# Patient Record
Sex: FEMALE | Race: WHITE | HISPANIC OR LATINO | Employment: UNEMPLOYED | URBAN - METROPOLITAN AREA
[De-identification: names, ages, dates, MRNs, and addresses within clinical notes are randomized per-mention and may not be internally consistent; named-entity substitution may affect disease eponyms.]

---

## 2017-01-06 ENCOUNTER — NON-PROVIDER VISIT (OUTPATIENT)
Dept: OBGYN | Facility: CLINIC | Age: 22
End: 2017-01-06
Payer: MEDICAID

## 2017-01-06 DIAGNOSIS — Z32.00 ENCOUNTER FOR PREGNANCY TEST, RESULT UNKNOWN: ICD-10-CM

## 2017-01-06 LAB
INT CON NEG: NEGATIVE
INT CON POS: POSITIVE
POC URINE PREGNANCY TEST: POSITIVE

## 2017-01-06 PROCEDURE — 81025 URINE PREGNANCY TEST: CPT | Performed by: PHYSICIAN ASSISTANT

## 2017-02-03 ENCOUNTER — INITIAL PRENATAL (OUTPATIENT)
Dept: OBGYN | Facility: CLINIC | Age: 22
End: 2017-02-03
Payer: MEDICAID

## 2017-02-03 VITALS
HEIGHT: 63 IN | WEIGHT: 157 LBS | SYSTOLIC BLOOD PRESSURE: 124 MMHG | BODY MASS INDEX: 27.82 KG/M2 | DIASTOLIC BLOOD PRESSURE: 78 MMHG

## 2017-02-03 DIAGNOSIS — O28.0 ABNORMAL QUAD SCREEN: ICD-10-CM

## 2017-02-03 DIAGNOSIS — Z34.82 PRENATAL CARE, SUBSEQUENT PREGNANCY, SECOND TRIMESTER: ICD-10-CM

## 2017-02-03 LAB
APPEARANCE UR: NORMAL
BILIRUB UR STRIP-MCNC: NORMAL MG/DL
COLOR UR AUTO: NORMAL
GLUCOSE UR STRIP.AUTO-MCNC: NORMAL MG/DL
KETONES UR STRIP.AUTO-MCNC: NORMAL MG/DL
LEUKOCYTE ESTERASE UR QL STRIP.AUTO: NORMAL
NITRITE UR QL STRIP.AUTO: NORMAL
PH UR STRIP.AUTO: 6 [PH] (ref 5–8)
PROT UR QL STRIP: NORMAL MG/DL
RBC UR QL AUTO: NORMAL
SP GR UR STRIP.AUTO: 1.01
UROBILINOGEN UR STRIP-MCNC: NORMAL MG/DL

## 2017-02-03 PROCEDURE — 59401 PR NEW OB VISIT: CPT | Performed by: NURSE PRACTITIONER

## 2017-02-03 PROCEDURE — 81002 URINALYSIS NONAUTO W/O SCOPE: CPT | Performed by: NURSE PRACTITIONER

## 2017-02-03 NOTE — PROGRESS NOTES
Pt. Here for NOB visit today.  #[phone]  First prenatal care  Pt. States no complaints.  Pharmacy verified.

## 2017-02-03 NOTE — PROGRESS NOTES
"S:  Araceli Vale is a 22 y.o.  who presents for her new OB exam.  She is 19w0d with and MILLIE of Estimated Date of Delivery: 17 by LMP. She has no complaints except for nausea for which she does not wish for intervention.  She is currently not working outside the home. No  heavy lifting or chemical exposure. No ER visits or previous care in this pregnancy.     desires AFP.  declines CF.  Denies VB, LOF, or cramping.  Denies dysuria, vaginal DC. Reports possible fetal movement.     Pt is  and lives with FOB.  Pregnancy is desired.      No past medical history on file.  No family history on file.  Social History     Social History   • Marital Status: Single     Spouse Name: N/A   • Number of Children: N/A   • Years of Education: N/A     Occupational History   • Not on file.     Social History Main Topics   • Smoking status: Never Smoker    • Smokeless tobacco: Never Used   • Alcohol Use: No   • Drug Use: No   • Sexual Activity:     Partners: Male     Birth Control/ Protection: Condom      Comment: planned pregnancy, same fob is involved     Other Topics Concern   • Not on file     Social History Narrative     OB History    Para Term  AB SAB TAB Ectopic Multiple Living   3 1 1  1 1    1      # Outcome Date GA Lbr Geovanny/2nd Weight Sex Delivery Anes PTL Lv   3 Current            2 Term 12/05/15 40w0d  2.3 kg (5 lb 1.1 oz) F Vag-Spont None N Y      Comments: Denies any complications   1 SAB 2014 8w0d             Comments: passed on its own          History of HSV I or II in self or partner: no  History of Thyroid problems: no    O:    Filed Vitals:    17 0831   BP: 124/78   Height: 1.6 m (5' 3\")   Weight: 71.215 kg (157 lb)      See Prenatal Physical.    Wet mount: none      A:   1.  IUP @ 19w0d per LMP        2.  S=D        3.  See problem list below               Patient Active Problem List    Diagnosis Date Noted   • Encounter for supervision of other normal pregnancy, first " trimester 08/11/2016   • Short interval between pregnancies  - 7 months 07/08/2016         P:  1.  GC/CT & pap done        2.  Prenatal labs ordered - lab slip given including AFP        3.  Discussed PNV, diet, avoidances and adequate water intake        4.  NOB packet given        5.  Return to office in 4 wks        6.  Complete OB US in first available.            No orders of the defined types were placed in this encounter.

## 2017-02-03 NOTE — MR AVS SNAPSHOT
"        Araceli Vale   2/3/2017 8:30 AM   Initial Prenatal   MRN: [id number]    Department[facility]   Dept Phone:  [phone]    Description:  Female : 1995   Provider:  Meghana Arredondo D.N.P.; [facility] INTAKE           Allergies as of 2/3/2017     No Known Allergies      You were diagnosed with     Prenatal care, subsequent pregnancy, second trimester   [430950]         Vital Signs     Blood Pressure Height Weight Body Mass Index Last Menstrual Period Breastfeeding?    124/78 mmHg 1.6 m (5' 3\") 71.215 kg (157 lb) 27.82 kg/m2 2016 (Exact Date) Unknown    Smoking Status                   Never Smoker            Basic Information     Date Of Birth Sex Race Ethnicity Preferred Language    1995 Female White  Origin (Upper sorbian,Ethiopian,Pakistani,Sai, etc) English      Problem List              ICD-10-CM Priority Class Noted - Resolved    Short interval between pregnancies  - 7 months O09.891   2016 - Present    Encounter for supervision of other normal pregnancy, first trimester Z34.81   2016 - Present      Health Maintenance        Date Due Completion Dates    IMM HEP B VACCINE (1 of 3 - Primary Series) 1995 ---    IMM HEP A VACCINE (1 of 2 - Standard Series) 1996 ---    IMM HPV VACCINE (1 of 3 - Female 3 Dose Series) 2006 ---    IMM VARICELLA (CHICKENPOX) VACCINE (1 of 2 - 2 Dose Adolescent Series) 2008 ---    IMM INFLUENZA (1) 2016 ---    PAP SMEAR 2018    IMM DTaP/Tdap/Td Vaccine (2 - Td) 9/15/2025 9/15/2015            Current Immunizations     Tdap Vaccine 9/15/2015      Below and/or attached are the medications your provider expects you to take. Review all of your home medications and newly ordered medications with your provider and/or pharmacist. Follow medication instructions as directed by your provider and/or pharmacist. Please keep your medication list with you and share with your provider. Update the information when medications are " discontinued, doses are changed, or new medications (including over-the-counter products) are added; and carry medication information at all times in the event of emergency situations     Allergies:  No Known Allergies          Medications  Valid as of: February 03, 2017 -  9:07 AM    Generic Name Brand Name Tablet Size Instructions for use    Hydrocodone-Acetaminophen (Tab) NORCO 5-325 MG Take 1-2 Tabs by mouth every four hours as needed.        Ondansetron (TABLET DISPERSIBLE) ZOFRAN ODT 4 MG Take 1 Tab by mouth every 8 hours as needed for Nausea/Vomiting.        Prenatal Multivit-Min-Fe-FA   Take  by mouth.        .                 Medicines prescribed today were sent to:     [facility] [address] [place] [address] (S), [place] - [address]    [address] [facility] [place] ([place]) [place] [address]    Phone: [phone] Fax: [phone]    Open 24 Hours?: No      Medication refill instructions:       If your prescription bottle indicates you have medication refills left, it is not necessary to call your provider’s office. Please contact your pharmacy and they will refill your medication.    If your prescription bottle indicates you do not have any refills left, you may request refills at any time through one of the following ways: The online [employer] system (except Urgent Care), by calling your provider’s office, or by asking your pharmacy to contact your provider’s office with a refill request. Medication refills are processed only during regular business hours and may not be available until the next business day. Your provider may request additional information or to have a follow-up visit with you prior to refilling your medication.   *Please Note: Medication refills are assigned a new Rx number when refilled electronically. Your pharmacy may indicate that no refills were authorized even though a new prescription for the same medication is available at the pharmacy. Please request the medicine by name with the pharmacy before contacting  your provider for a refill.        Your To Do List     Future Labs/Procedures Complete By Expires    AFP TETRA  As directed 2/3/2018    PREG CNTR PRENATAL PN  As directed 2/3/2018    THINPREP RFLX HPV ASCUS W/CTNG  As directed 2/3/2018    US-OB 2ND 3RD TRI COMPLETE  As directed 2/3/2018         MyChart Status: Patient Declined

## 2017-02-07 LAB
2ND TRIMESTER 4 SCREEN SERPL-IMP: NORMAL
ABO GROUP BLD: NORMAL
BACTERIA UR CULT: NORMAL
BLD GP AB SCN SERPL QL: NORMAL
HBV SURFACE AG SERPL QL IA: NEGATIVE
HCT VFR BLD AUTO: 39.1 %
HGB BLD-MCNC: 12.5 G/DL
HIV 1 0 2 IC ZHVIC: NORMAL
PHYSICIAN READ PAP  881411: NORMAL
PLATELET # BLD AUTO: 221 10*3/UL
RH BLD: NORMAL
RPR SER QL: NORMAL
RUBV IGG SERPL IA-ACNC: NORMAL

## 2017-02-08 PROBLEM — O28.0 ABNORMAL QUAD SCREEN: Status: ACTIVE | Noted: 2017-02-08

## 2017-02-09 ENCOUNTER — TELEPHONE (OUTPATIENT)
Dept: OBGYN | Facility: CLINIC | Age: 22
End: 2017-02-09

## 2017-02-09 NOTE — TELEPHONE ENCOUNTER
Per Dr. Magdi Mcdaniel for pt to be refer to [facility] for abnormal AFP for ONTD 1:22.    Pt notified of abnormal AFP, explained to pt this is a risk test, and is not telling that the baby has any problems. Pt informed she will be refer to [facility] for a detail US and evaluation and her appt for [facility] at [facility] will be cancel. Pt informed [facility] will be calling her to schedule an appt but if pt does not receive a call by Monday 2/13/17 advised to call [facility] to schedule appt. Pt agreed and verbalized understanding.

## 2017-03-01 ENCOUNTER — TELEPHONE (OUTPATIENT)
Dept: OBGYN | Facility: CLINIC | Age: 22
End: 2017-03-01

## 2017-03-01 NOTE — TELEPHONE ENCOUNTER
Per Dr. Kaur. MD to call [facility] or pt to verify if she is going to have D&E w/[facility] or have f/u for cytotec.    I spoke w/[employer] rep whom stated pt is going to call them back to see if she is going to have D&E or wait to see us to discuss termination.  [facility] will notify me when pt calls    Pt has a f/u on 3/6/17 w/MD to discuss termination.

## 2017-03-02 ENCOUNTER — ROUTINE PRENATAL (OUTPATIENT)
Dept: OBGYN | Facility: CLINIC | Age: 22
End: 2017-03-02
Payer: MEDICAID

## 2017-03-02 VITALS — WEIGHT: 163 LBS | SYSTOLIC BLOOD PRESSURE: 124 MMHG | BODY MASS INDEX: 28.88 KG/M2 | DIASTOLIC BLOOD PRESSURE: 80 MMHG

## 2017-03-02 DIAGNOSIS — O35.9XX0: ICD-10-CM

## 2017-03-02 PROCEDURE — 90040 PR PRENATAL FOLLOW UP: CPT | Performed by: NURSE PRACTITIONER

## 2017-03-02 NOTE — PROGRESS NOTES
Pt here today for OB follow up  Reports no FM  Denies any complaints today  WT:163lb  BP: 124/80  Good # [phone]

## 2017-03-02 NOTE — MR AVS SNAPSHOT
Araceli Vale   3/2/2017 8:15 AM   Routine Prenatal   MRN: [id number]    Department:  [facility]   Dept Phone:  [phone]    Description:  Female : 1995   Provider:  Meghana Arredondo D.N.P.           Allergies as of 3/2/2017     No Known Allergies      Vital Signs     Blood Pressure Weight Last Menstrual Period Smoking Status          124/80 mmHg 73.936 kg (163 lb) 2016 (Exact Date) Never Smoker         Basic Information     Date Of Birth Sex Race Ethnicity Preferred Language    1995 Female White  Origin (Angolan,Armenian,Croatian,Montenegrin, etc) English      Your appointments     Mar 06, 2017  8:30 AM   OB Follow Up with FEDE ROTHMAN   [facility])    [address] [place] [id number] [phone]              Problem List              ICD-10-CM Priority Class Noted - Resolved    Short interval between pregnancies  - 7 months O09.891   2016 - Present    Encounter for supervision of other normal pregnancy, first trimester Z34.81   2016 - Present    Abnormal quad screen - MICHELE ONTD 1:22 O28.0   2017 - Present      Health Maintenance        Date Due Completion Dates    IMM HEP B VACCINE (1 of 3 - Primary Series) 1995 ---    IMM HEP A VACCINE (1 of 2 - Standard Series) 1996 ---    IMM HPV VACCINE (1 of 3 - Female 3 Dose Series) 2006 ---    IMM VARICELLA (CHICKENPOX) VACCINE (1 of 2 - 2 Dose Adolescent Series) 2008 ---    IMM INFLUENZA (1) 2016 ---    PAP SMEAR 2020, 2015    IMM DTaP/Tdap/Td Vaccine (2 - Td) 9/15/2025 9/15/2015            Current Immunizations     Tdap Vaccine 9/15/2015      Below and/or attached are the medications your provider expects you to take. Review all of your home medications and newly ordered medications with your provider and/or pharmacist. Follow medication instructions as directed by your provider and/or pharmacist. Please keep your medication list with you and share with your  provider. Update the information when medications are discontinued, doses are changed, or new medications (including over-the-counter products) are added; and carry medication information at all times in the event of emergency situations     Allergies:  No Known Allergies          Medications  Valid as of: March 02, 2017 -  8:39 AM    Generic Name Brand Name Tablet Size Instructions for use    Hydrocodone-Acetaminophen (Tab) NORCO 5-325 MG Take 1-2 Tabs by mouth every four hours as needed.        Ondansetron (TABLET DISPERSIBLE) ZOFRAN ODT 4 MG Take 1 Tab by mouth every 8 hours as needed for Nausea/Vomiting.        Prenatal Multivit-Min-Fe-FA   Take  by mouth.        .                 Medicines prescribed today were sent to:     [address] (S), [place] - [address]    [facility] [address] [place] ([place]) [place] [address]    Phone: [phone] Fax: [phone]    Open 24 Hours?: No      Medication refill instructions:       If your prescription bottle indicates you have medication refills left, it is not necessary to call your provider’s office. Please contact your pharmacy and they will refill your medication.    If your prescription bottle indicates you do not have any refills left, you may request refills at any time through one of the following ways: The online [address] system (except Urgent Care), by calling your provider’s office, or by asking your pharmacy to contact your provider’s office with a refill request. Medication refills are processed only during regular business hours and may not be available until the next business day. Your provider may request additional information or to have a follow-up visit with you prior to refilling your medication.   *Please Note: Medication refills are assigned a new Rx number when refilled electronically. Your pharmacy may indicate that no refills were authorized even though a new prescription for the same medication is available at the pharmacy. Please request the  medicine by name with the pharmacy before contacting your provider for a refill.           MyChart Status: Patient Declined

## 2017-03-02 NOTE — PROGRESS NOTES
S) Pt is a 22 y.o.   at 22w6d  gestation. Routine prenatal care today. Reports positive  fetal movement. Denies cramping, bleeding or leaking of fluid. Has had consult with Dr. Soler with additional testing and it was recommended that she terminate the pregnancy. She is not yet settled with the idea but is seeking to understand. She understands she will likely need to terminate.   O) see flow         Filed Vitals:    17 0813   BP: 124/80   Weight: 73.936 kg (163 lb)           Lab: abnormal AFP       Pertinent ultrasound - Multiple fetal anomalies            A) IUP at 22w6d       S=D         Patient Active Problem List    Diagnosis Date Noted   • Abnormal quad screen - MICHELE ONTD 1:22 2017   • Encounter for supervision of other normal pregnancy, first trimester 2016   • Short interval between pregnancies  - 7 months 2016     Multiple fetal anomalies likely necessitating termination.       P) Appt with MD on . Basic questions answered. Patient will think about options over the weekend and will likely agree to continue with termination.

## 2017-03-06 ENCOUNTER — ROUTINE PRENATAL (OUTPATIENT)
Dept: OBGYN | Facility: CLINIC | Age: 22
End: 2017-03-06
Payer: MEDICAID

## 2017-03-06 VITALS — BODY MASS INDEX: 28.53 KG/M2 | SYSTOLIC BLOOD PRESSURE: 114 MMHG | WEIGHT: 161 LBS | DIASTOLIC BLOOD PRESSURE: 62 MMHG

## 2017-03-06 DIAGNOSIS — O08.89 PARTIAL MOLAR PREGNANCY: ICD-10-CM

## 2017-03-06 PROCEDURE — 90040 PR PRENATAL FOLLOW UP: CPT | Performed by: OBSTETRICS & GYNECOLOGY

## 2017-03-06 NOTE — PROGRESS NOTES
22-year-old  011 with a documented molar pregnancy, partial mole 69 XXY patient is been informed of status and was encouraged to proceed with termination of pregnancy secondary to non-viability of fetus and potential maternal risk. Patient was counseled by perinatology and given options for D&E versus medical termination. Patient is here today to continue to discuss options.    Discussed with patient options D&E versus medical termination of pregnancy risks benefits associated with both. Patient is mostly concerned with being able to be with fetus after delivery therefore will proceed with medical termination of pregnancy.    Induction of labor is scheduled for tomorrow morning 8 AM, instructions are given  We'll proceed with Cytotec termination secondary to lethal fetal anomalies

## 2017-03-06 NOTE — MR AVS SNAPSHOT
Araceli Vale   3/6/2017 8:30 AM   Routine Prenatal   MRN: [id number]    Department:  [facility]   Dept Phone:  [phone]    Description:  Female : 1995   Provider:  Ekta Kaur M.D.           Allergies as of 3/6/2017     No Known Allergies      You were diagnosed with     Partial molar pregnancy   [886276]         Vital Signs     Blood Pressure Weight Last Menstrual Period Smoking Status          114/62 mmHg 73.029 kg (161 lb) 2016 (Exact Date) Never Smoker         Basic Information     Date Of Birth Sex Race Ethnicity Preferred Language    1995 Female White  Origin (Kazakh,Ugandan,Citizen of Seychell[place],Bangladeshi, etc) English      Your appointments     Mar 07, 2017  8:00 AM   [facility] INDUCTION OF LABOR with NON-SURGICAL L&D   [facility] & DELIVERY [facility] (--)    [address] [place] [address] [id number] [phone]              Problem List              ICD-10-CM Priority Class Noted - Resolved    Short interval between pregnancies  - 7 months O09.891   2016 - Present    Encounter for supervision of other normal pregnancy, first trimester Z34.81   2016 - Present    Abnormal quad screen - MICHELE ONTD 1:22 O28.0   2017 - Present    Fetal anomaly necessitating delivery O35.9XX0   3/2/2017 - Present    Partial molar pregnancy O08.89   3/6/2017 - Present      Health Maintenance        Date Due Completion Dates    IMM HEP B VACCINE (1 of 3 - Primary Series) 1995 ---    IMM HEP A VACCINE (1 of 2 - Standard Series) 1996 ---    IMM HPV VACCINE (1 of 3 - Female 3 Dose Series) 2006 ---    IMM VARICELLA (CHICKENPOX) VACCINE (1 of 2 - 2 Dose Adolescent Series) 2008 ---    IMM INFLUENZA (1) 2016 ---    PAP SMEAR 2020, 2015    IMM DTaP/Tdap/Td Vaccine (2 - Td) 9/15/2025 9/15/2015            Current Immunizations     Tdap Vaccine 9/15/2015      Below and/or attached are the medications your provider expects you to take. Review all of your home medications and newly  ordered medications with your provider and/or pharmacist. Follow medication instructions as directed by your provider and/or pharmacist. Please keep your medication list with you and share with your provider. Update the information when medications are discontinued, doses are changed, or new medications (including over-the-counter products) are added; and carry medication information at all times in the event of emergency situations     Allergies:  No Known Allergies          Medications  Valid as of: March 06, 2017 -  9:40 AM    Generic Name Brand Name Tablet Size Instructions for use    Hydrocodone-Acetaminophen (Tab) NORCO 5-325 MG Take 1-2 Tabs by mouth every four hours as needed.        Ondansetron (TABLET DISPERSIBLE) ZOFRAN ODT 4 MG Take 1 Tab by mouth every 8 hours as needed for Nausea/Vomiting.        Prenatal Multivit-Min-Fe-FA   Take  by mouth.        .                 Medicines prescribed today were sent to:     [facility] [address] [place] [address] (S), [place] - [address]    [facility] [facility] [place] ([place]) [place] [address]    Phone: [phone] Fax: [phone]    Open 24 Hours?: No      Medication refill instructions:       If your prescription bottle indicates you have medication refills left, it is not necessary to call your provider’s office. Please contact your pharmacy and they will refill your medication.    If your prescription bottle indicates you do not have any refills left, you may request refills at any time through one of the following ways: The online [employer] system (except Urgent Care), by calling your provider’s office, or by asking your pharmacy to contact your provider’s office with a refill request. Medication refills are processed only during regular business hours and may not be available until the next business day. Your provider may request additional information or to have a follow-up visit with you prior to refilling your medication.   *Please Note: Medication refills are assigned a new Rx  number when refilled electronically. Your pharmacy may indicate that no refills were authorized even though a new prescription for the same medication is available at the pharmacy. Please request the medicine by name with the pharmacy before contacting your provider for a refill.        Your To Do List     Future Labs/Procedures Complete By Expires    INDUCTION OF LABOR  As directed 3/6/2018    Scheduling Instructions:    Molar pregnancy patient is desiring medical termination for this abnormal pregnancy         MyChart Status: Patient Declined

## 2017-03-10 ENCOUNTER — HOSPITAL ENCOUNTER (OUTPATIENT)
Facility: MEDICAL CENTER | Age: 22
End: 2017-03-10
Attending: OBSTETRICS & GYNECOLOGY | Admitting: OBSTETRICS & GYNECOLOGY
Payer: MEDICAID

## 2017-03-10 ENCOUNTER — ROUTINE PRENATAL (OUTPATIENT)
Dept: OBGYN | Facility: CLINIC | Age: 22
End: 2017-03-10
Payer: MEDICAID

## 2017-03-10 VITALS — BODY MASS INDEX: 29.06 KG/M2 | SYSTOLIC BLOOD PRESSURE: 142 MMHG | WEIGHT: 164 LBS | DIASTOLIC BLOOD PRESSURE: 84 MMHG

## 2017-03-10 DIAGNOSIS — O08.89 PARTIAL MOLAR PREGNANCY: ICD-10-CM

## 2017-03-10 PROCEDURE — 90040 PR PRENATAL FOLLOW UP: CPT | Performed by: OBSTETRICS & GYNECOLOGY

## 2017-03-10 NOTE — MR AVS SNAPSHOT
Araceli Vale   3/10/2017 8:30 AM   Routine Prenatal   MRN: [id number]    Department:  [facility]   Dept Phone:  [phone]    Description:  Female : 1995   Provider:  Yancy Haley M.D.           Allergies as of 3/10/2017     No Known Allergies      You were diagnosed with     Partial molar pregnancy   [[id number]]         Vital Signs     Blood Pressure Weight Last Menstrual Period Smoking Status          142/84 mmHg 74.39 kg (164 lb) 2016 (Exact Date) Never Smoker         Basic Information     Date Of Birth Sex Race Ethnicity Preferred Language    1995 Female White  Origin (Faroese,British Virgin Islander,Algerian,Sai, etc) English      Your appointments     Mar 11, 2017  9:00 PM   [facility] INDUCTION OF LABOR with NON-SURGICAL L&D   [facility] DELIVERY [facility] (--)    [address] [place] [phone]              Problem List              ICD-10-CM Priority Class Noted - Resolved    Short interval between pregnancies  - 7 months O09.891   2016 - Present    Encounter for supervision of other normal pregnancy, first trimester Z34.81   2016 - Present    Abnormal quad screen - MICHELE ONTD 1:22 O28.0   2017 - Present    Fetal anomaly necessitating delivery O35.9XX0   3/2/2017 - Present    Partial molar pregnancy O08.89   3/6/2017 - Present      Health Maintenance        Date Due Completion Dates    IMM HEP B VACCINE (1 of 3 - Primary Series) 1995 ---    IMM HEP A VACCINE (1 of 2 - Standard Series) 1996 ---    IMM HPV VACCINE (1 of 3 - Female 3 Dose Series) 2006 ---    IMM VARICELLA (CHICKENPOX) VACCINE (1 of 2 - 2 Dose Adolescent Series) 2008 ---    IMM INFLUENZA (1) 2016 ---    PAP SMEAR 2020, 2015    IMM DTaP/Tdap/Td Vaccine (2 - Td) 9/15/2025 9/15/2015            Current Immunizations     Tdap Vaccine 9/15/2015      Below and/or attached are the medications your provider expects you to take. Review all of your home medications and newly  ordered medications with your provider and/or pharmacist. Follow medication instructions as directed by your provider and/or pharmacist. Please keep your medication list with you and share with your provider. Update the information when medications are discontinued, doses are changed, or new medications (including over-the-counter products) are added; and carry medication information at all times in the event of emergency situations     Allergies:  No Known Allergies          Medications  Valid as of: March 10, 2017 -  9:16 AM    Generic Name Brand Name Tablet Size Instructions for use    Hydrocodone-Acetaminophen (Tab) NORCO 5-325 MG Take 1-2 Tabs by mouth every four hours as needed.        Ondansetron (TABLET DISPERSIBLE) ZOFRAN ODT 4 MG Take 1 Tab by mouth every 8 hours as needed for Nausea/Vomiting.        Prenatal Multivit-Min-Fe-FA   Take  by mouth.        .                 Medicines prescribed today were sent to:     [facility] [address] [place] [address] (S), [place] - [address]    [facility] [place] ([place]) [place] [address]    Phone: [phone] Fax: [phone]    Open 24 Hours?: No      Medication refill instructions:       If your prescription bottle indicates you have medication refills left, it is not necessary to call your provider’s office. Please contact your pharmacy and they will refill your medication.    If your prescription bottle indicates you do not have any refills left, you may request refills at any time through one of the following ways: The online [employer] system (except Urgent Care), by calling your provider’s office, or by asking your pharmacy to contact your provider’s office with a refill request. Medication refills are processed only during regular business hours and may not be available until the next business day. Your provider may request additional information or to have a follow-up visit with you prior to refilling your medication.   *Please Note: Medication refills are assigned a new Rx  number when refilled electronically. Your pharmacy may indicate that no refills were authorized even though a new prescription for the same medication is available at the pharmacy. Please request the medicine by name with the pharmacy before contacting your provider for a refill.           MyChart Status: Patient Declined

## 2017-03-10 NOTE — PROGRESS NOTES
Pt presented today to discuss termination that is set for 3/11/17 @ 9 pm. During visit her father stated termination goes against their Hindu and wants to discuss other options. Discussed risks of IOL for partial mole including bleeding, pain, possible uterine perforation, need for D&C and possible hysterectomy. Pt also concerned about how long the induction may take and wanted to discuss other options. Pt stated Dr. Soler did talk to her about a D&E but [facility] doctor did not. Explained to her she stated to Dr. Kaur that she wanted to hold the baby and was scheduled for an IOL earlier but she cancelled because it was not convenient for her schedule.     After consultation with her father, she has decided to have a D&E.    Dr. Soler called and he agrees to performing the D&E    Referral sent.

## 2017-03-10 NOTE — PROGRESS NOTES
Pt here today for Pre OP (Molar Pregnancy)  WT: 164 lb  BP: 142/84  IOL on 03/11/17 at 9:00 pm. Instructions given.   Pt states no complaints today  Good # [phone]

## 2017-03-15 ENCOUNTER — HOSPITAL ENCOUNTER (OUTPATIENT)
Facility: MEDICAL CENTER | Age: 22
End: 2017-03-15
Attending: OBSTETRICS & GYNECOLOGY | Admitting: OBSTETRICS & GYNECOLOGY
Payer: MEDICAID

## 2017-03-16 ENCOUNTER — HOSPITAL ENCOUNTER (OUTPATIENT)
Dept: RADIOLOGY | Facility: MEDICAL CENTER | Age: 22
End: 2017-03-16
Attending: OBSTETRICS & GYNECOLOGY | Admitting: OBSTETRICS & GYNECOLOGY
Payer: MEDICAID

## 2017-03-16 VITALS — BODY MASS INDEX: 28.4 KG/M2 | HEIGHT: 63 IN | WEIGHT: 160.27 LBS

## 2017-03-16 DIAGNOSIS — Z01.811 PRE-OPERATIVE RESPIRATORY EXAMINATION: ICD-10-CM

## 2017-03-16 DIAGNOSIS — Z01.810 PRE-OPERATIVE CARDIOVASCULAR EXAMINATION: ICD-10-CM

## 2017-03-16 DIAGNOSIS — Z01.812 PRE-OPERATIVE LABORATORY EXAMINATION: ICD-10-CM

## 2017-03-16 LAB
ABO GROUP BLD: NORMAL
B-HCG SERPL-ACNC: ABNORMAL MIU/ML (ref 0–5)
BASOPHILS # BLD AUTO: 0.3 % (ref 0–1.8)
BASOPHILS # BLD: 0.02 K/UL (ref 0–0.12)
BLD GP AB SCN SERPL QL: NORMAL
EOSINOPHIL # BLD AUTO: 0.03 K/UL (ref 0–0.51)
EOSINOPHIL NFR BLD: 0.5 % (ref 0–6.9)
ERYTHROCYTE [DISTWIDTH] IN BLOOD BY AUTOMATED COUNT: 38.4 FL (ref 35.9–50)
HCT VFR BLD AUTO: 34 % (ref 37–47)
HGB BLD-MCNC: 10.7 G/DL (ref 12–16)
IMM GRANULOCYTES # BLD AUTO: 0.03 K/UL (ref 0–0.11)
IMM GRANULOCYTES NFR BLD AUTO: 0.5 % (ref 0–0.9)
LYMPHOCYTES # BLD AUTO: 1.41 K/UL (ref 1–4.8)
LYMPHOCYTES NFR BLD: 24.1 % (ref 22–41)
MCH RBC QN AUTO: 22.4 PG (ref 27–33)
MCHC RBC AUTO-ENTMCNC: 31.5 G/DL (ref 33.6–35)
MCV RBC AUTO: 71.1 FL (ref 81.4–97.8)
MONOCYTES # BLD AUTO: 0.37 K/UL (ref 0–0.85)
MONOCYTES NFR BLD AUTO: 6.3 % (ref 0–13.4)
NEUTROPHILS # BLD AUTO: 3.98 K/UL (ref 2–7.15)
NEUTROPHILS NFR BLD: 68.3 % (ref 44–72)
NRBC # BLD AUTO: 0 K/UL
NRBC BLD AUTO-RTO: 0 /100 WBC
PLATELET # BLD AUTO: 264 K/UL (ref 164–446)
PMV BLD AUTO: 10.4 FL (ref 9–12.9)
RBC # BLD AUTO: 4.78 M/UL (ref 4.2–5.4)
RH BLD: NORMAL
WBC # BLD AUTO: 5.8 K/UL (ref 4.8–10.8)

## 2017-03-16 PROCEDURE — 85025 COMPLETE CBC W/AUTO DIFF WBC: CPT

## 2017-03-16 PROCEDURE — 71020 DX-CHEST-2 VIEWS: CPT

## 2017-03-16 PROCEDURE — 86850 RBC ANTIBODY SCREEN: CPT

## 2017-03-16 PROCEDURE — 36415 COLL VENOUS BLD VENIPUNCTURE: CPT

## 2017-03-16 PROCEDURE — 84702 CHORIONIC GONADOTROPIN TEST: CPT

## 2017-03-16 PROCEDURE — 86900 BLOOD TYPING SEROLOGIC ABO: CPT

## 2017-03-16 PROCEDURE — 86901 BLOOD TYPING SEROLOGIC RH(D): CPT

## 2017-03-17 ENCOUNTER — HOSPITAL ENCOUNTER (INPATIENT)
Facility: MEDICAL CENTER | Age: 22
LOS: 2 days | End: 2017-03-19
Attending: OBSTETRICS & GYNECOLOGY | Admitting: OBSTETRICS & GYNECOLOGY
Payer: MEDICAID

## 2017-03-17 DIAGNOSIS — O35.9XX0: ICD-10-CM

## 2017-03-17 LAB
ALBUMIN SERPL BCP-MCNC: 3.5 G/DL (ref 3.2–4.9)
ALBUMIN/GLOB SERPL: 0.9 G/DL
ALP SERPL-CCNC: 136 U/L (ref 30–99)
ALT SERPL-CCNC: 10 U/L (ref 2–50)
ANION GAP SERPL CALC-SCNC: 19 MMOL/L (ref 0–11.9)
APTT PPP: 24.6 SEC (ref 24.7–36)
AST SERPL-CCNC: 19 U/L (ref 12–45)
BASOPHILS # BLD AUTO: 0.1 % (ref 0–1.8)
BASOPHILS # BLD AUTO: 0.3 % (ref 0–1.8)
BASOPHILS # BLD: 0.02 K/UL (ref 0–0.12)
BASOPHILS # BLD: 0.03 K/UL (ref 0–0.12)
BILIRUB SERPL-MCNC: 0.5 MG/DL (ref 0.1–1.5)
BUN SERPL-MCNC: 10 MG/DL (ref 8–22)
CALCIUM SERPL-MCNC: 9.5 MG/DL (ref 8.5–10.5)
CHLORIDE SERPL-SCNC: 104 MMOL/L (ref 96–112)
CO2 SERPL-SCNC: 10 MMOL/L (ref 20–33)
CREAT SERPL-MCNC: 0.54 MG/DL (ref 0.5–1.4)
EOSINOPHIL # BLD AUTO: 0 K/UL (ref 0–0.51)
EOSINOPHIL # BLD AUTO: 0.02 K/UL (ref 0–0.51)
EOSINOPHIL NFR BLD: 0 % (ref 0–6.9)
EOSINOPHIL NFR BLD: 0.2 % (ref 0–6.9)
ERYTHROCYTE [DISTWIDTH] IN BLOOD BY AUTOMATED COUNT: 37.1 FL (ref 35.9–50)
ERYTHROCYTE [DISTWIDTH] IN BLOOD BY AUTOMATED COUNT: 37.4 FL (ref 35.9–50)
GFR SERPL CREATININE-BSD FRML MDRD: >60 ML/MIN/1.73 M 2
GLOBULIN SER CALC-MCNC: 3.8 G/DL (ref 1.9–3.5)
GLUCOSE SERPL-MCNC: 98 MG/DL (ref 65–99)
HCT VFR BLD AUTO: 34.7 % (ref 37–47)
HCT VFR BLD AUTO: 35.4 % (ref 37–47)
HGB BLD-MCNC: 11.1 G/DL (ref 12–16)
HGB BLD-MCNC: 11.5 G/DL (ref 12–16)
HOLDING TUBE BB 8507: NORMAL
IMM GRANULOCYTES # BLD AUTO: 0.06 K/UL (ref 0–0.11)
IMM GRANULOCYTES # BLD AUTO: 0.09 K/UL (ref 0–0.11)
IMM GRANULOCYTES NFR BLD AUTO: 0.5 % (ref 0–0.9)
IMM GRANULOCYTES NFR BLD AUTO: 0.6 % (ref 0–0.9)
INR PPP: 0.87 (ref 0.87–1.13)
LYMPHOCYTES # BLD AUTO: 1.17 K/UL (ref 1–4.8)
LYMPHOCYTES # BLD AUTO: 3.86 K/UL (ref 1–4.8)
LYMPHOCYTES NFR BLD: 34.3 % (ref 22–41)
LYMPHOCYTES NFR BLD: 7.9 % (ref 22–41)
MCH RBC QN AUTO: 22.1 PG (ref 27–33)
MCH RBC QN AUTO: 22.8 PG (ref 27–33)
MCHC RBC AUTO-ENTMCNC: 31.4 G/DL (ref 33.6–35)
MCHC RBC AUTO-ENTMCNC: 33.1 G/DL (ref 33.6–35)
MCV RBC AUTO: 68.7 FL (ref 81.4–97.8)
MCV RBC AUTO: 70.4 FL (ref 81.4–97.8)
MONOCYTES # BLD AUTO: 0.2 K/UL (ref 0–0.85)
MONOCYTES # BLD AUTO: 0.74 K/UL (ref 0–0.85)
MONOCYTES NFR BLD AUTO: 1.4 % (ref 0–13.4)
MONOCYTES NFR BLD AUTO: 6.6 % (ref 0–13.4)
NEUTROPHILS # BLD AUTO: 13.28 K/UL (ref 2–7.15)
NEUTROPHILS # BLD AUTO: 6.55 K/UL (ref 2–7.15)
NEUTROPHILS NFR BLD: 58.1 % (ref 44–72)
NEUTROPHILS NFR BLD: 90 % (ref 44–72)
NRBC # BLD AUTO: 0 K/UL
NRBC # BLD AUTO: 0.02 K/UL
NRBC BLD AUTO-RTO: 0 /100 WBC
NRBC BLD AUTO-RTO: 0.2 /100 WBC
PLATELET # BLD AUTO: 262 K/UL (ref 164–446)
PLATELET # BLD AUTO: 310 K/UL (ref 164–446)
PMV BLD AUTO: 10.2 FL (ref 9–12.9)
PMV BLD AUTO: 9.9 FL (ref 9–12.9)
POTASSIUM SERPL-SCNC: 3.2 MMOL/L (ref 3.6–5.5)
PROT SERPL-MCNC: 7.3 G/DL (ref 6–8.2)
PROTHROMBIN TIME: 12.1 SEC (ref 12–14.6)
RBC # BLD AUTO: 5.03 M/UL (ref 4.2–5.4)
RBC # BLD AUTO: 5.05 M/UL (ref 4.2–5.4)
SODIUM SERPL-SCNC: 133 MMOL/L (ref 135–145)
WBC # BLD AUTO: 11.3 K/UL (ref 4.8–10.8)
WBC # BLD AUTO: 14.8 K/UL (ref 4.8–10.8)

## 2017-03-17 PROCEDURE — 700102 HCHG RX REV CODE 250 W/ 637 OVERRIDE(OP): Performed by: OBSTETRICS & GYNECOLOGY

## 2017-03-17 PROCEDURE — 700111 HCHG RX REV CODE 636 W/ 250 OVERRIDE (IP)

## 2017-03-17 PROCEDURE — 700101 HCHG RX REV CODE 250

## 2017-03-17 PROCEDURE — 59409 OBSTETRICAL CARE: CPT

## 2017-03-17 PROCEDURE — 36415 COLL VENOUS BLD VENIPUNCTURE: CPT

## 2017-03-17 PROCEDURE — 700111 HCHG RX REV CODE 636 W/ 250 OVERRIDE (IP): Performed by: NURSE PRACTITIONER

## 2017-03-17 PROCEDURE — 59160 D & C AFTER DELIVERY: CPT

## 2017-03-17 PROCEDURE — 85025 COMPLETE CBC W/AUTO DIFF WBC: CPT

## 2017-03-17 PROCEDURE — 85730 THROMBOPLASTIN TIME PARTIAL: CPT

## 2017-03-17 PROCEDURE — A9270 NON-COVERED ITEM OR SERVICE: HCPCS

## 2017-03-17 PROCEDURE — 306828 HCHG ANES-TIME GENERAL: Performed by: OBSTETRICS & GYNECOLOGY

## 2017-03-17 PROCEDURE — 304965 HCHG RECOVERY SERVICES

## 2017-03-17 PROCEDURE — 700102 HCHG RX REV CODE 250 W/ 637 OVERRIDE(OP)

## 2017-03-17 PROCEDURE — 83735 ASSAY OF MAGNESIUM: CPT

## 2017-03-17 PROCEDURE — 88305 TISSUE EXAM BY PATHOLOGIST: CPT

## 2017-03-17 PROCEDURE — 85610 PROTHROMBIN TIME: CPT

## 2017-03-17 PROCEDURE — 700111 HCHG RX REV CODE 636 W/ 250 OVERRIDE (IP): Performed by: OBSTETRICS & GYNECOLOGY

## 2017-03-17 PROCEDURE — 84550 ASSAY OF BLOOD/URIC ACID: CPT

## 2017-03-17 PROCEDURE — 80053 COMPREHEN METABOLIC PANEL: CPT | Mod: 91

## 2017-03-17 PROCEDURE — A9270 NON-COVERED ITEM OR SERVICE: HCPCS | Performed by: OBSTETRICS & GYNECOLOGY

## 2017-03-17 PROCEDURE — 770002 HCHG ROOM/CARE - OB PRIVATE (112)

## 2017-03-17 RX ORDER — SODIUM CHLORIDE, SODIUM LACTATE, POTASSIUM CHLORIDE, CALCIUM CHLORIDE 600; 310; 30; 20 MG/100ML; MG/100ML; MG/100ML; MG/100ML
INJECTION, SOLUTION INTRAVENOUS
Status: COMPLETED
Start: 2017-03-17 | End: 2017-03-17

## 2017-03-17 RX ORDER — OXYCODONE HYDROCHLORIDE AND ACETAMINOPHEN 5; 325 MG/1; MG/1
1 TABLET ORAL EVERY 4 HOURS PRN
Status: DISCONTINUED | OUTPATIENT
Start: 2017-03-17 | End: 2017-03-19 | Stop reason: HOSPADM

## 2017-03-17 RX ORDER — METOCLOPRAMIDE HYDROCHLORIDE 5 MG/ML
INJECTION INTRAMUSCULAR; INTRAVENOUS
Status: COMPLETED
Start: 2017-03-17 | End: 2017-03-17

## 2017-03-17 RX ORDER — IBUPROFEN 600 MG/1
600 TABLET ORAL EVERY 6 HOURS PRN
Status: DISCONTINUED | OUTPATIENT
Start: 2017-03-17 | End: 2017-03-19 | Stop reason: HOSPADM

## 2017-03-17 RX ORDER — METOCLOPRAMIDE HYDROCHLORIDE 5 MG/ML
10 INJECTION INTRAMUSCULAR; INTRAVENOUS ONCE
Status: COMPLETED | OUTPATIENT
Start: 2017-03-17 | End: 2017-03-17

## 2017-03-17 RX ORDER — METHYLERGONOVINE MALEATE 0.2 MG/1
0.2 TABLET ORAL EVERY 6 HOURS
Status: DISCONTINUED | OUTPATIENT
Start: 2017-03-18 | End: 2017-03-17

## 2017-03-17 RX ORDER — VITAMIN A ACETATE, BETA CAROTENE, ASCORBIC ACID, CHOLECALCIFEROL, .ALPHA.-TOCOPHEROL ACETATE, DL-, THIAMINE MONONITRATE, RIBOFLAVIN, NIACINAMIDE, PYRIDOXINE HYDROCHLORIDE, FOLIC ACID, CYANOCOBALAMIN, CALCIUM CARBONATE, FERROUS FUMARATE, ZINC OXIDE, CUPRIC OXIDE 3080; 12; 120; 400; 1; 1.84; 3; 20; 22; 920; 25; 200; 27; 10; 2 [IU]/1; UG/1; MG/1; [IU]/1; MG/1; MG/1; MG/1; MG/1; MG/1; [IU]/1; MG/1; MG/1; MG/1; MG/1; MG/1
1 TABLET, FILM COATED ORAL EVERY MORNING
Status: DISCONTINUED | OUTPATIENT
Start: 2017-03-18 | End: 2017-03-19 | Stop reason: HOSPADM

## 2017-03-17 RX ORDER — NIFEDIPINE 10 MG/1
10 CAPSULE ORAL ONCE
Status: COMPLETED | OUTPATIENT
Start: 2017-03-17 | End: 2017-03-17

## 2017-03-17 RX ORDER — DOCUSATE SODIUM 100 MG/1
100 CAPSULE, LIQUID FILLED ORAL 2 TIMES DAILY PRN
Status: DISCONTINUED | OUTPATIENT
Start: 2017-03-17 | End: 2017-03-19 | Stop reason: HOSPADM

## 2017-03-17 RX ORDER — SODIUM CHLORIDE, SODIUM LACTATE, POTASSIUM CHLORIDE, CALCIUM CHLORIDE 600; 310; 30; 20 MG/100ML; MG/100ML; MG/100ML; MG/100ML
INJECTION, SOLUTION INTRAVENOUS CONTINUOUS
Status: DISCONTINUED | OUTPATIENT
Start: 2017-03-17 | End: 2017-03-19 | Stop reason: HOSPADM

## 2017-03-17 RX ORDER — MAGNESIUM SULFATE HEPTAHYDRATE 40 MG/ML
4 INJECTION, SOLUTION INTRAVENOUS ONCE
Status: COMPLETED | OUTPATIENT
Start: 2017-03-17 | End: 2017-03-17

## 2017-03-17 RX ORDER — MIDAZOLAM HYDROCHLORIDE 1 MG/ML
INJECTION INTRAMUSCULAR; INTRAVENOUS
Status: ACTIVE
Start: 2017-03-17 | End: 2017-03-18

## 2017-03-17 RX ORDER — SODIUM CHLORIDE, SODIUM LACTATE, POTASSIUM CHLORIDE, CALCIUM CHLORIDE 600; 310; 30; 20 MG/100ML; MG/100ML; MG/100ML; MG/100ML
INJECTION, SOLUTION INTRAVENOUS PRN
Status: DISCONTINUED | OUTPATIENT
Start: 2017-03-17 | End: 2017-03-19 | Stop reason: HOSPADM

## 2017-03-17 RX ORDER — MAGNESIUM SULFATE HEPTAHYDRATE 40 MG/ML
2 INJECTION, SOLUTION INTRAVENOUS CONTINUOUS
Status: DISCONTINUED | OUTPATIENT
Start: 2017-03-17 | End: 2017-03-19 | Stop reason: HOSPADM

## 2017-03-17 RX ORDER — METHYLERGONOVINE MALEATE 0.2 MG/ML
INJECTION INTRAVENOUS
Status: DISCONTINUED
Start: 2017-03-17 | End: 2017-03-17

## 2017-03-17 RX ADMIN — FENTANYL CITRATE 100 MCG: 50 INJECTION, SOLUTION INTRAMUSCULAR; INTRAVENOUS at 19:22

## 2017-03-17 RX ADMIN — FAMOTIDINE 20 MG: 10 INJECTION INTRAVENOUS at 19:46

## 2017-03-17 RX ADMIN — OXYTOCIN 125 ML/HR: 10 INJECTION, SOLUTION INTRAMUSCULAR; INTRAVENOUS at 21:00

## 2017-03-17 RX ADMIN — Medication 20 UNITS: at 19:22

## 2017-03-17 RX ADMIN — METOCLOPRAMIDE HYDROCHLORIDE 10 MG: 5 INJECTION INTRAMUSCULAR; INTRAVENOUS at 19:46

## 2017-03-17 RX ADMIN — SODIUM CHLORIDE, POTASSIUM CHLORIDE, SODIUM LACTATE AND CALCIUM CHLORIDE 1000 ML: 600; 310; 30; 20 INJECTION, SOLUTION INTRAVENOUS at 18:50

## 2017-03-17 RX ADMIN — MAGNESIUM SULFATE IN WATER 4 G: 40 INJECTION, SOLUTION INTRAVENOUS at 23:02

## 2017-03-17 RX ADMIN — METOCLOPRAMIDE 10 MG: 5 INJECTION, SOLUTION INTRAMUSCULAR; INTRAVENOUS at 19:46

## 2017-03-17 RX ADMIN — MAGNESIUM SULFATE IN WATER 2 G/HR: 40 INJECTION, SOLUTION INTRAVENOUS at 23:31

## 2017-03-17 RX ADMIN — SODIUM CITRATE AND CITRIC ACID MONOHYDRATE 30 ML: 500; 334 SOLUTION ORAL at 19:46

## 2017-03-17 RX ADMIN — SODIUM CHLORIDE, SODIUM LACTATE, POTASSIUM CHLORIDE, CALCIUM CHLORIDE 1000 ML: 600; 310; 30; 20 INJECTION, SOLUTION INTRAVENOUS at 18:50

## 2017-03-17 RX ADMIN — NIFEDIPINE 10 MG: 10 CAPSULE ORAL at 22:47

## 2017-03-17 RX ADMIN — OXYTOCIN 20 UNITS: 10 INJECTION, SOLUTION INTRAMUSCULAR; INTRAVENOUS at 19:22

## 2017-03-17 ASSESSMENT — LIFESTYLE VARIABLES
EVER_SMOKED: NEVER
ALCOHOL_USE: NO

## 2017-03-17 ASSESSMENT — PAIN SCALES - GENERAL
PAINLEVEL_OUTOF10: 0

## 2017-03-18 PROBLEM — O01.1 PARTIAL HYDATIDIFORM MOLE: Status: ACTIVE | Noted: 2017-03-18

## 2017-03-18 LAB
ALBUMIN SERPL BCP-MCNC: 2.8 G/DL (ref 3.2–4.9)
ALBUMIN SERPL BCP-MCNC: 3 G/DL (ref 3.2–4.9)
ALBUMIN/GLOB SERPL: 0.8 G/DL
ALBUMIN/GLOB SERPL: 0.8 G/DL
ALP SERPL-CCNC: 118 U/L (ref 30–99)
ALP SERPL-CCNC: 120 U/L (ref 30–99)
ALT SERPL-CCNC: 7 U/L (ref 2–50)
ALT SERPL-CCNC: 8 U/L (ref 2–50)
ANION GAP SERPL CALC-SCNC: 8 MMOL/L (ref 0–11.9)
ANION GAP SERPL CALC-SCNC: 9 MMOL/L (ref 0–11.9)
AST SERPL-CCNC: 17 U/L (ref 12–45)
AST SERPL-CCNC: 18 U/L (ref 12–45)
B-HCG SERPL-ACNC: ABNORMAL MIU/ML (ref 0–5)
BASOPHILS # BLD AUTO: 0.2 % (ref 0–1.8)
BASOPHILS # BLD: 0.02 K/UL (ref 0–0.12)
BILIRUB SERPL-MCNC: 0.3 MG/DL (ref 0.1–1.5)
BILIRUB SERPL-MCNC: 0.3 MG/DL (ref 0.1–1.5)
BUN SERPL-MCNC: 7 MG/DL (ref 8–22)
BUN SERPL-MCNC: 7 MG/DL (ref 8–22)
CALCIUM SERPL-MCNC: 8.1 MG/DL (ref 8.5–10.5)
CALCIUM SERPL-MCNC: 8.8 MG/DL (ref 8.5–10.5)
CHLORIDE SERPL-SCNC: 106 MMOL/L (ref 96–112)
CHLORIDE SERPL-SCNC: 107 MMOL/L (ref 96–112)
CO2 SERPL-SCNC: 18 MMOL/L (ref 20–33)
CO2 SERPL-SCNC: 18 MMOL/L (ref 20–33)
CREAT SERPL-MCNC: 0.53 MG/DL (ref 0.5–1.4)
CREAT SERPL-MCNC: 0.65 MG/DL (ref 0.5–1.4)
EOSINOPHIL # BLD AUTO: 0 K/UL (ref 0–0.51)
EOSINOPHIL NFR BLD: 0 % (ref 0–6.9)
ERYTHROCYTE [DISTWIDTH] IN BLOOD BY AUTOMATED COUNT: 37.5 FL (ref 35.9–50)
GFR SERPL CREATININE-BSD FRML MDRD: >60 ML/MIN/1.73 M 2
GFR SERPL CREATININE-BSD FRML MDRD: >60 ML/MIN/1.73 M 2
GLOBULIN SER CALC-MCNC: 3.6 G/DL (ref 1.9–3.5)
GLOBULIN SER CALC-MCNC: 3.7 G/DL (ref 1.9–3.5)
GLUCOSE SERPL-MCNC: 171 MG/DL (ref 65–99)
GLUCOSE SERPL-MCNC: 95 MG/DL (ref 65–99)
HCT VFR BLD AUTO: 34.7 % (ref 37–47)
HGB BLD-MCNC: 10.8 G/DL (ref 12–16)
IMM GRANULOCYTES # BLD AUTO: 0.1 K/UL (ref 0–0.11)
IMM GRANULOCYTES NFR BLD AUTO: 0.8 % (ref 0–0.9)
LYMPHOCYTES # BLD AUTO: 1.33 K/UL (ref 1–4.8)
LYMPHOCYTES NFR BLD: 10 % (ref 22–41)
MAGNESIUM SERPL-MCNC: 1.7 MG/DL (ref 1.5–2.5)
MAGNESIUM SERPL-MCNC: 5.4 MG/DL (ref 1.5–2.5)
MAGNESIUM SERPL-MCNC: 5.8 MG/DL (ref 1.5–2.5)
MAGNESIUM SERPL-MCNC: 5.8 MG/DL (ref 1.5–2.5)
MAGNESIUM SERPL-MCNC: 5.9 MG/DL (ref 1.5–2.5)
MCH RBC QN AUTO: 22 PG (ref 27–33)
MCHC RBC AUTO-ENTMCNC: 31.1 G/DL (ref 33.6–35)
MCV RBC AUTO: 70.5 FL (ref 81.4–97.8)
MONOCYTES # BLD AUTO: 0.19 K/UL (ref 0–0.85)
MONOCYTES NFR BLD AUTO: 1.4 % (ref 0–13.4)
NEUTROPHILS # BLD AUTO: 11.66 K/UL (ref 2–7.15)
NEUTROPHILS NFR BLD: 87.6 % (ref 44–72)
NRBC # BLD AUTO: 0 K/UL
NRBC BLD AUTO-RTO: 0 /100 WBC
PLATELET # BLD AUTO: 255 K/UL (ref 164–446)
PMV BLD AUTO: 10 FL (ref 9–12.9)
POTASSIUM SERPL-SCNC: 3.9 MMOL/L (ref 3.6–5.5)
POTASSIUM SERPL-SCNC: 4.3 MMOL/L (ref 3.6–5.5)
PROT SERPL-MCNC: 6.4 G/DL (ref 6–8.2)
PROT SERPL-MCNC: 6.7 G/DL (ref 6–8.2)
RBC # BLD AUTO: 4.92 M/UL (ref 4.2–5.4)
SODIUM SERPL-SCNC: 132 MMOL/L (ref 135–145)
SODIUM SERPL-SCNC: 134 MMOL/L (ref 135–145)
URATE SERPL-MCNC: 4.6 MG/DL (ref 1.9–8.2)
WBC # BLD AUTO: 13.3 K/UL (ref 4.8–10.8)

## 2017-03-18 PROCEDURE — 85025 COMPLETE CBC W/AUTO DIFF WBC: CPT

## 2017-03-18 PROCEDURE — 36415 COLL VENOUS BLD VENIPUNCTURE: CPT

## 2017-03-18 PROCEDURE — 700111 HCHG RX REV CODE 636 W/ 250 OVERRIDE (IP): Performed by: OBSTETRICS & GYNECOLOGY

## 2017-03-18 PROCEDURE — 700102 HCHG RX REV CODE 250 W/ 637 OVERRIDE(OP): Performed by: NURSE PRACTITIONER

## 2017-03-18 PROCEDURE — 80053 COMPREHEN METABOLIC PANEL: CPT

## 2017-03-18 PROCEDURE — 770002 HCHG ROOM/CARE - OB PRIVATE (112)

## 2017-03-18 PROCEDURE — A9270 NON-COVERED ITEM OR SERVICE: HCPCS | Performed by: NURSE PRACTITIONER

## 2017-03-18 PROCEDURE — 83735 ASSAY OF MAGNESIUM: CPT

## 2017-03-18 PROCEDURE — 84702 CHORIONIC GONADOTROPIN TEST: CPT

## 2017-03-18 RX ORDER — CALCIUM GLUCONATE 94 MG/ML
1 INJECTION, SOLUTION INTRAVENOUS ONCE
Status: ACTIVE | OUTPATIENT
Start: 2017-03-18 | End: 2017-03-19

## 2017-03-18 RX ADMIN — MAGNESIUM SULFATE IN WATER 2 G/HR: 40 INJECTION, SOLUTION INTRAVENOUS at 18:41

## 2017-03-18 RX ADMIN — SODIUM CHLORIDE, POTASSIUM CHLORIDE, SODIUM LACTATE AND CALCIUM CHLORIDE 1000 ML: 600; 310; 30; 20 INJECTION, SOLUTION INTRAVENOUS at 08:43

## 2017-03-18 RX ADMIN — MAGNESIUM SULFATE IN WATER 2 G/HR: 40 INJECTION, SOLUTION INTRAVENOUS at 08:42

## 2017-03-18 RX ADMIN — Medication 1 TABLET: at 08:42

## 2017-03-18 ASSESSMENT — PAIN SCALES - GENERAL
PAINLEVEL_OUTOF10: 0

## 2017-03-18 ASSESSMENT — COPD QUESTIONNAIRES
COPD SCREENING SCORE: 0
DO YOU EVER COUGH UP ANY MUCUS OR PHLEGM?: NO/ONLY WITH OCCASIONAL COLDS OR INFECTIONS
DURING THE PAST 4 WEEKS HOW MUCH DID YOU FEEL SHORT OF BREATH: NONE/LITTLE OF THE TIME
HAVE YOU SMOKED AT LEAST 100 CIGARETTES IN YOUR ENTIRE LIFE: NO/DON'T KNOW

## 2017-03-18 ASSESSMENT — PATIENT HEALTH QUESTIONNAIRE - PHQ9
SUM OF ALL RESPONSES TO PHQ9 QUESTIONS 1 AND 2: 0
SUM OF ALL RESPONSES TO PHQ QUESTIONS 1-9: 0
1. LITTLE INTEREST OR PLEASURE IN DOING THINGS: NOT AT ALL
2. FEELING DOWN, DEPRESSED, IRRITABLE, OR HOPELESS: NOT AT ALL

## 2017-03-18 NOTE — H&P
ADDENDUM    HISTORY OF PRESENT ILLNESS:  This is a 22-year-old  2, para 0, AB 1 who   is known to have a partial hydatidiform mole.  Fetus has triploidy   and cranial abnormalities.    Patient received 8 laminaria earlier today and was scheduled for D and E   tomorrow; however, presented with vaginal bleeding and precipitously   delivered.  The patient was delivered by the pregnancy center staff.  Placenta   was also delivered apparently intact; however, due to hydropic change, it was   difficult to determine if it was complete.    Upon my arrival, the baby still had a heartbeat and moderate vaginal bleeding   was still present.  It was elected to proceed with a uterine curettage to   assure complete removal of the tissues.    See detailed H and P that was dictated today for preop.    Significant change, now status post delivery, 8 laminaria previously placed   were counted for by the RN on duty.    ASSESSMENT:  1.  Status post vaginal delivery.  2.  Spontaneous abortions induced by laminaria.  3.  Partial hydatidiform mole.  4.  Triploidy.    PLAN:  Proceed with D and C to assure complete removal of tissues.  Patient   consented and agreed to procedure.    Time: 35 minutes       ____________________________________     MD NEVA BENÍTEZ / JEFFERY    DD:  2017 20:50:56  DT:  2017 21:05:42    D#:  [id number]  Job#:  [id number]

## 2017-03-18 NOTE — PROGRESS NOTES
"Late Entry:    0: IV started and labs drawn/sent.  : Notfied Dr. Soler of pt arrival on unit. Updated on pt status.  : Pt sat up in bed. SROM, clear fluid.  : KAMI Arredondo CNM and Dr. Kimball called at bedside to  for delivery.  of 25w0d infant \" Bekah\".  APGARS.   : Dr. Soler at bedside to evaluate pt in person. Discussed with pt the need for a D&C. Pt consented for D&C.   : Pt in the OR.  : Pt transferred back to S224 via bed. RN to continue care.  : Dr. Soler at bedside to check on pt progress.   : 2 RN confirmation no HR on baby. Mother is more awake and alert, given baby to hold. Family wishing to bless the baby.  : Pt assisted to bathroom to void. Tolerated well.   : Spoke with MARIEL Shepherd  concerning pt request for a .  : Spoke with Dr. Soler concerning pt elevated pt /104 and 166/93. Order received for Nifedipine, Magnesium per Preeclampsia protocol ( 4g bolus then 2g/hr maintenance). MD would like to be updated after Nifedipine is given. Per MD run Total IV fluids at 100ml/hr.  224: Pt and family update on POC. No further questions at this time.  2247: Nifedipine given, See MAR.  2302: Magnesium Sulfate bolus started, See MAR.   2315: La started, tolerated well.  2320: Dr. Soler updated on VS and BP's trend. Stop Methergine per MD. Give 600mg of Cytotec if Pt bleeding starts.  0237: Pt HR increased to 150's. Pt asymptomatic at this time.  0250: Pt c/o SOB. RN placed oxygen mask at 10L.   0255: Spoke with Dr. Soler concerning -160's. Updated on interventions. Stat CBC/ CMP ordered per MD. Per MD turn Pitocin down to 25ml/hr and Magnesium the same rate. Pt stated that she is feeling better.  0453: Coleen CLARK, RN notified of  subsequent death.  0500: Notified with Vasyl Joel.  0510: Left a message for [facility] [employer].  0700: Report given to LADY Bryson. POC discussed.No further questions at " this time.

## 2017-03-18 NOTE — PROGRESS NOTES
S:  Pt feeling well.  No complaints.  Denies headache.  Had elevated BP last night, responded to nifedipine.    O: Afebrile       BP: 140/95 mmHg       Fundus firm       Lochia: rubra       Hct: 34.7, platelet 255K       BUN 7 Normal AST    A: Active Problems:    Partial hydatidiform mole   POD 1 uterine curettage   Preeclampsia    P: MFM to sign off      Long term care with [facility]     Pt to complete Vibramycin.

## 2017-03-18 NOTE — PROCEDURES
Called to patient room for impending fetal demise delivery. Dr. Soler had previously placed laminaria. Apparently patient was in [facility] and felt a gush. Delivery of fetus and placenta at 1822 after minimal pushing efforts as fetal parts were already protruding from vagina. Pitocin IV given and 100 mcg fentanyl. Dr. Soler is now in house and will assume care of patient. Dr. Kimball was also present for the delivery.

## 2017-03-18 NOTE — DISCHARGE PLANNING
SHARONleft message with   Father Jacky earlier in the evening. Father Jacky called back and stated he will be on his way to [facility] shortly. SHARON alerted bedside RN.

## 2017-03-18 NOTE — DISCHARGE SUMMARY
Discharge Summary:      Araceli Vale      Admit Date:   3/17/2017  Discharge Date:  3/18/2017     Admitting diagnosis:  Pregnancy  Labor and delivery indication for care or intervention Delivery of fetal demise, partial molar pregnancy  D&C  Discharge Diagnosis: Status post vaginal, spontaneous.  Pregnancy Complications: see below problem list  Tubal Ligation:  no        History:  History reviewed. No pertinent past medical history.  OB History    Para Term  AB SAB TAB Ectopic Multiple Living   3 1 1  1 1    1      # Outcome Date GA Lbr Geovanny/2nd Weight Sex Delivery Anes PTL Lv   3 Current            2 Term 12/05/15 40w0d  2.3 kg (5 lb 1.1 oz) F Vag-Spont None N Y      Comments: Denies any complications   1 SAB 2014 8w0d             Comments: passed on its own           Review of patient's allergies indicates no known allergies.  Patient Active Problem List    Diagnosis Date Noted   • Partial molar pregnancy 2017   • Fetal anomaly necessitating delivery 2017   • Abnormal quad screen - [facility] ONTD 1:22 2017   • Encounter for supervision of other normal pregnancy, first trimester 2016   • Short interval between pregnancies  - 7 months 2016        Hospital Course:   22 y.o. , now para 2, was admitted with the above mentioned diagnosis, underwent Active Labor, vaginal, spontaneous. Patient postpartum course was unremarkable, with progressive advancement in diet , ambulation and toleration of oral analgesia. Patient without complaints today and desires discharge.      Filed Vitals:    17 0234 17 0237 17 0239 17 0244   BP:  142/86     Pulse: 111 117 153 164   Temp:       TempSrc:       Resp:       Height:       Weight:       SpO2: 94%  94% 95%       Current Facility-Administered Medications   Medication Dose   • calcium GLUCONATE injection 1 g  1 g   • LR infusion     • docusate sodium (COLACE) capsule 100 mg  100 mg   • prenatal plus vitamin  (STUARTNATAL 1+1) 27-1 MG tablet 1 Tab  1 Tab   • oxycodone-acetaminophen (PERCOCET) 5-325 MG per tablet 1 Tab  1 Tab   • ibuprofen (MOTRIN) tablet 600 mg  600 mg   • MIDAZOLAM HCL 2 MG/2ML INJ SOLN     • FENTANYL CITRATE 0.05 MG/ML INJ SOLN     • lactated ringers infusion     • magnesium sulfate 20 g/500mL infusion  2 g/hr       Exam:  Breast Exam: negative  Abdomen: Abdomen soft, non-tender. BS normal. No masses,  No organomegaly  Fundus Non Tender: yes  Incision: none  Perineum: perineum intact  Extremity: extremities, peripheral pulses and reflexes normal     Labs:  Recent Labs      03/17/17   1850  03/17/17   2253  03/18/17   0314   WBC  11.3*  14.8*  13.3*   RBC  5.05  5.03  4.92   HEMOGLOBIN  11.5*  11.1*  10.8*   HEMATOCRIT  34.7*  35.4*  34.7*   MCV  68.7*  70.4*  70.5*   MCH  22.8*  22.1*  22.0*   MCHC  33.1*  31.4*  31.1*   RDW  37.4  37.1  37.5   PLATELETCT  310  262  255   MPV  9.9  10.2  10.0        Activity:   Discharge to home  Pelvic Rest x 6 weeks    Assessment:  normal postpartum course  Discharge Assessment: No areas of skin breakdown/redness; surgical incision intact/healing     Follow up: .Follow up at [facility] in 1 week. S/p fetal demise with laminaria placement. D&C performed by Dr. Soler. It is of note that patient has elevated bp and is tachycardia. Afebrile. CBC unremarkable. Dr. Soler to round on patient this morning and give further recommendation.      Discharge Meds:   No current outpatient prescriptions on file.       LISA BlackwoodP.        Addendum to Discharge:  DOA: 3/17/17  DOD: 3/19/17    Pt was D/C to home yesterday however Dr. Soler wanted the patient to have MgSO4 for 24 hours after delivery. Pt has been stable with no s/s of preeclampsia or eclampsia. Pt desires to go home today.     Pelvic rest x 6 weeks  RTC in 1 week  Preeclampsia precautions

## 2017-03-18 NOTE — PROGRESS NOTES
1000: Report received from Julia NARAYANAN and Adelaide NARAYANAN. Pt still has baby in the room; FOB holding her. Neither one of them are tearful at this time; they desire to keep Bekah with them. Pt still hasn't decided on a  home yet; no questions for RN. Food ordered already; bleeding light; denies pain  Dr. Soler in house; orders to keep pt on magnesium sulfate for 24 hr; pt updated on POC

## 2017-03-18 NOTE — OP REPORT
DATE OF SERVICE:  03/17/2017    PREOPERATIVE DIAGNOSES:  1.  Intrauterine pregnancy at 22 weeks.  2.  Partial molar pregnancy.  3.  Triploidy.  4.  Spontaneous labor, post-laminaria insertion, spontaneous vaginal delivery.  5.  Vaginal bleeding, rule out retained placental molar tissue.    POSTOPERATIVE DIAGNOSES:  1.  Intrauterine pregnancy at 22 weeks.  2.  Partial molar pregnancy.  3.  Triploidy.  4.  Spontaneous labor, post-laminaria insertion, spontaneous vaginal delivery.  5.  Vaginal bleeding, rule out retained placental molar tissue.    SURGEON:  Urban Soler MD    ANESTHESIOLOGIST:  Chapis Kolb MD    ANESTHESIA:  General.    PROCEDURE:  D and C under ultrasound guidance.    DESCRIPTION OF PROCEDURE:  After induction of general anesthetic, the patient   placed in dorsal lithotomy position, prepped and draped in usual manner for a   vaginal procedure.  There was moderate bleeding encountered and ultrasound   guidance revealed what appeared to be a fair amount of decidual tissue.    Under ultrasound guidance, #16 suction curette was introduced into the uterus   and suction curettage was carried out under direct visualization.    Moderate amounts of decidual type tissue were removed and sharp curette using   a 4-quadrant search revealed minimal tissues remaining.  Suction curettage was   repeated to assure complete removal of tissue, there was an endometrial   stripe visible.  There was adequate hemostasis.  Patient received Methergine   0.2 mg IM.  The patient tolerated the procedure well.    ESTIMATED BLOOD LOSS:  50 mL    Patient returned to recovery in satisfactory condition.  Patient is Rh   positive.       ____________________________________     URBAN SOLER MD    EYO / NTS    DD:  03/17/2017 20:43:49  DT:  03/17/2017 20:54:09    D#:  [id number]  Job#:  [id number]

## 2017-03-18 NOTE — PROGRESS NOTES
Report received from Chantel NARAYANAN on NOC taking care of pt. Pt resting quietly holding her baby in her arms.assessment for BPs and urine output noted pt offered breakfast menu left at bedside. No needs at this time. 0900 Julia MARTE RN here to take over care of pt. Report given and pt care to cont.

## 2017-03-19 VITALS
HEART RATE: 75 BPM | RESPIRATION RATE: 20 BRPM | HEIGHT: 63 IN | TEMPERATURE: 97.7 F | DIASTOLIC BLOOD PRESSURE: 98 MMHG | SYSTOLIC BLOOD PRESSURE: 151 MMHG | WEIGHT: 165 LBS | BODY MASS INDEX: 29.23 KG/M2 | OXYGEN SATURATION: 96 %

## 2017-03-19 NOTE — PROGRESS NOTES
2130- report received from WOLFGANG Love RN, accepted care of pt. VSS, no needs at this time. Call light in reach.   2300- mag d/c'd, kael d/c'd.   0200- pt up to bathroom. Void without difficulty.   0400- reported elevated BP's to Dr. Haley, no new orders at this time.   0700- report to HILDA Ibarra RN

## 2017-03-19 NOTE — PROGRESS NOTES
07  Report from NOC RN, pt resting and RN will return later for DC.  Dr. Haley has seen the pt and given DC orders.  0830  DC instructions given and IV removed at this time.  Pt states understanding of when to call her MD and has no further questions.  Pt into her regular clothing and to home at this time.

## 2017-03-19 NOTE — PROGRESS NOTES
2130: Orders received from Dr. Haley to d/c magnesium sulfate at 1100. BPs WNL all day. Report given to Cara NARAYANAN

## 2017-03-19 NOTE — H&P
HISTORY OF PRESENT ILLNESS:  This is a 22-year-old  4, para 1, AB 2 who   had a working EDC of 2017, which has subsequently been revised to , which is placing her at 22 weeks today.  Patient was identified to have   a partial mole on  and underwent an amniocentesis which revealed   triploidy 69, XXY.    Patient has been counseled, initially was seeking induction of labor with the   pregnancy center; however, this was canceled and request was made for D and E.    Prior authorization was obtained through her insurer and has been approved.    Patient has previously been consulted with the implications of partial molar   pregnancy and that this is a nonviable fetus, but also there is a significant   risk of bleeding, preeclampsia, and postpartum hemorrhage.    There is approximately 5% chance of persistent molar tissue, which can also   metastasize.  She has had a chest x-ray which was negative and was done   preoperatively.    After receiving counseling, she agreed to D&E.    PAST MEDICAL HISTORY:  She denies any major medical problems, asthma,   seizures, hypertension, cardiovascular, GI or  diseases.    OPERATIONS:  None.    TRANSFUSIONS:  None.    ALLERGIES:  None.    VENEREAL DISEASE HISTORY:  Negative.    HABITS:  Tobacco, alcohol and drug use denied.    MEDICATIONS:  None.    PAST OBSTETRICAL HISTORY:  In  spontaneous AB; in , term female, 5   pounds 1 ounce, , no complications; in 2016, spontaneous AB.  Father of   baby for this pregnancy is same as all others.    FAMILY HISTORY:  Negative for birth defects.    PHYSICAL EXAMINATION:  GENERAL:  Well-developed, well-nourished female, alert and oriented x3.  HEAD:  Normocephalic.  EYES:  No scleral icterus or subconjunctival pallor.  Pupils equal, reactive   to light and accommodation.  Extraocular movements symmetrical.  EAR, NOSE AND THROAT:  Grossly within normal limits.  LUNGS:  Clear.  HEART:  Regular rate and  rhythm.  Normal S1 and S2.  No S3, S4 or murmurs.  ABDOMEN:  Soft, gravid uterus, consistent with a 22-week size uterus.  EXTREMITIES:  No edema or varicosities.  PELVIC:  Reveals she had 8 laminaria placed today.    ASSESSMENT:  1.  Intrauterine pregnancy at 22 weeks with partial molar pregnancy.  2.  Triploidy.  3.  Patient elected to terminate pregnancy.  4.  Patient's blood type is A positive.    PLAN:  We will proceed with D and E tomorrow and all questions have been   answered to her satisfaction.  Risks of the procedure were discussed with her   in detail including complications.  Alternative is induction of labor,   however, that may be associated with increased risk of metastasis.  Expected   management is not recommended due to significant risk of bleeding.  All   questions answered to her satisfaction.       ____________________________________     MD NEVA BENÍTEZ / JEFFERY    DD:  03/17/2017 19:08:15  DT:  03/17/2017 19:32:45    D#:  [id number]  Job#:  [id number]

## 2017-03-28 ENCOUNTER — POST PARTUM (OUTPATIENT)
Dept: OBGYN | Facility: CLINIC | Age: 22
End: 2017-03-28
Payer: MEDICAID

## 2017-03-28 VITALS
BODY MASS INDEX: 27.11 KG/M2 | DIASTOLIC BLOOD PRESSURE: 82 MMHG | HEART RATE: 80 BPM | SYSTOLIC BLOOD PRESSURE: 136 MMHG | WEIGHT: 153 LBS

## 2017-03-28 DIAGNOSIS — O08.89 PARTIAL MOLAR PREGNANCY: ICD-10-CM

## 2017-03-28 PROCEDURE — 99214 OFFICE O/P EST MOD 30 MIN: CPT | Performed by: OBSTETRICS & GYNECOLOGY

## 2017-03-28 NOTE — NON-PROVIDER
PP visit after    WT: 153 lb  BP: 136/82  Preferred pharmacy verified with pt  Good # [phone]

## 2017-03-28 NOTE — PROGRESS NOTES
Chief Complaint   Patient presents with   • Other     PP visit after        History of present illness: 22 y.o. presents with above chief complaint. Pt had a partial molar pregnancy that was terminated on 3/17/17 without any complications. Since the termination the patient has adjusted well and does not have any complaints. She is still on pelvic rest. States she has not resumed much activity and is still on bedrest most of the time. Currently not bleeding, having any pain or discharge.    Review of systems:  Pertinent positives documented in HPI and a comprehensive review of system is negative as follows:    Constitutional ROS: No unexpected change in weight, No weakness, No fatigue, No unexplained fevers, sweats, or chills  Mouth/Throat ROS: No bleeding gums, No sore throat, No recent change in voice or hoarseness  Neck ROS: No lumps or masses, No swollen glands, No significant pain in neck  Pulmonary ROS: No chronic cough, sputum, or hemoptysis, No dyspnea on exertion, No wheezing, No shortness of breath, No recent change in breathing  Cardiovascular ROS: No exercise intolerance, No chest pain, No shortness of breath, No palpitations, No syncope  Genitourinary ROS: Negative for dysuria, frequency and incontinence  Gastrointestinal ROS: No abdominal pain, No change in bowel habits, No significant change in appetite, No nausea, vomiting, diarrhea, or constipation, No hematemesis, No abdominal bloating or early satiety  Breast ROS: No new breast lumps or masses, No severe breast pain, No nipple discharge  Musculoskeletal/Extremities ROS: No cyanosis, No peripheral edema, No pain, redness or swelling on the joints  Hematologic/Lymphatic ROS: No anemia, No abnormal bleeding, No chills, No bruising, No weight loss  Skin/Integumentary ROS: No evidence of bleeding or bruising, No abnormal skin lesions noted, No evidence of rash, No itching  Neurologic ROS: No chronic headaches, No seizures, No weakness  Psychiatric  ROS: No depression, No anxiety, No psychosis    All PMH, PSH, allergies, social history and FH reviewed and updated today:  History reviewed. No pertinent past medical history.    Past Surgical History   Procedure Laterality Date   • Dilation and curettage  3/17/2017     Procedure: DILATION AND CURETTAGE;  Surgeon: Vic Soler M.D.;  Location: LABOR AND DELIVERY;  Service:        Allergies: No Known Allergies    Social History     Social History   • Marital Status: Single     Spouse Name: N/A   • Number of Children: N/A   • Years of Education: N/A     Occupational History   • Not on file.     Social History Main Topics   • Smoking status: Never Smoker    • Smokeless tobacco: Never Used   • Alcohol Use: No   • Drug Use: No   • Sexual Activity:     Partners: Male     Birth Control/ Protection: Condom      Comment: planned pregnancy, same fob is involved     Other Topics Concern   • Not on file     Social History Narrative       History reviewed. No pertinent family history.    Physical exam:  Blood pressure 136/82, pulse 80, weight 69.4 kg (153 lb), last menstrual period 09/23/2016, unknown if currently breastfeeding.    GENERAL APPEARANCE: healthy, alert, no distress, cooperative  NECK nontender, no masses, thyromegaly or nodules  HEART RRR with normal S1 and S2 ,no murmurs, no gallops, no peripheral edema  LUNG clear to auscultation, normal respiratory effort  ABDOMEN Abdomen soft, non-tender. BS normal. No masses,  No organomegaly  EXTREMITIES:negative clubbing, cyanosis, edema    NEURO Awake, alert and oriented x 3, Normal gait, no sensory deficits  SKIN No rashes, or ulcers or lesions seen  PSYCHIATRIC: Patient shows appropriate affect, is alert and oriented x3, intact judgment and insight.    1. Partial molar pregnancy  HCG QUANTITATIVE    HCG QUANTITATIVE    HCG QUANTITATIVE    HCG QUANTITATIVE     Discussed need to not become pregnant for 1 year after molar pregnancy terminated.  Will follow HCG levels weekly  until it becomes zero x 3. Pelvic rest x 6 weeks and recommend a birth control method at that time. RTC in 4 weeks

## 2017-03-28 NOTE — MR AVS SNAPSHOT
Araceli Vale   3/28/2017 11:00 AM   Post Partum   MRN: [id number]    Department:  [facility]   Dept Phone:  [phone]    Description:  Female : 1995   Provider:  Yancy Haley M.D.           Reason for Visit     Other PP visit after       Allergies as of 3/28/2017     No Known Allergies      You were diagnosed with     Partial molar pregnancy   [112622]         Vital Signs     Blood Pressure Pulse Weight Last Menstrual Period Breastfeeding? Smoking Status    136/82 mmHg 80 69.4 kg (153 lb) 2016 (Exact Date) Unknown Never Smoker       Basic Information     Date Of Birth Sex Race Ethnicity Preferred Language    1995 Female White  Origin (C[place] Verdean,Togolese,Barbadian,Sai, etc) English      Problem List              ICD-10-CM Priority Class Noted - Resolved    Short interval between pregnancies  - 7 months O09.891   2016 - Present    Encounter for supervision of other normal pregnancy, first trimester Z34.81   2016 - Present    Abnormal quad screen - [facility] ONTD 1:22 O28.0   2017 - Present    Fetal anomaly necessitating delivery O35.9XX0   3/2/2017 - Present    Partial molar pregnancy O08.89   3/6/2017 - Present    Partial hydatidiform mole O01.1   3/18/2017 - Present      Health Maintenance        Date Due Completion Dates    IMM HEP B VACCINE (1 of 3 - Primary Series) 1995 ---    IMM HEP A VACCINE (1 of 2 - Standard Series) 1996 ---    IMM HPV VACCINE (1 of 3 - Female 3 Dose Series) 2006 ---    IMM VARICELLA (CHICKENPOX) VACCINE (1 of 2 - 2 Dose Adolescent Series) 2008 ---    IMM INFLUENZA (1) 2016 ---    PAP SMEAR 2020, 2015    IMM DTaP/Tdap/Td Vaccine (2 - Td) 9/15/2025 9/15/2015            Current Immunizations     Tdap Vaccine 9/15/2015      Below and/or attached are the medications your provider expects you to take. Review all of your home medications and newly ordered medications with your provider and/or pharmacist. Follow  medication instructions as directed by your provider and/or pharmacist. Please keep your medication list with you and share with your provider. Update the information when medications are discontinued, doses are changed, or new medications (including over-the-counter products) are added; and carry medication information at all times in the event of emergency situations     Allergies:  No Known Allergies          Medications  Valid as of: March 28, 2017 - 11:25 AM    Generic Name Brand Name Tablet Size Instructions for use    Prenatal Multivit-Min-Fe-FA   Take  by mouth.        .                 Medicines prescribed today were sent to:     [facility] [facility] ([place] [place] [address]    [address] [facility] [place] [address]    Phone: [phone] Fax: [phone]    Open 24 Hours?: No      Medication refill instructions:       If your prescription bottle indicates you have medication refills left, it is not necessary to call your provider’s office. Please contact your pharmacy and they will refill your medication.    If your prescription bottle indicates you do not have any refills left, you may request refills at any time through one of the following ways: The online [employer] system (except Urgent Care), by calling your provider’s office, or by asking your pharmacy to contact your provider’s office with a refill request. Medication refills are processed only during regular business hours and may not be available until the next business day. Your provider may request additional information or to have a follow-up visit with you prior to refilling your medication.   *Please Note: Medication refills are assigned a new Rx number when refilled electronically. Your pharmacy may indicate that no refills were authorized even though a new prescription for the same medication is available at the pharmacy. Please request the medicine by name with the pharmacy before contacting your provider for a refill.        Your  To Do List     Future Labs/Procedures Complete By Expires    HCG QUANTITATIVE  As directed 3/29/2018    HCG QUANTITATIVE  As directed 3/29/2018    HCG QUANTITATIVE  As directed 3/29/2018    HCG QUANTITATIVE  As directed 3/29/2018         MyChart Status: Patient Declined

## 2017-04-27 ENCOUNTER — POST PARTUM (OUTPATIENT)
Dept: OBGYN | Facility: CLINIC | Age: 22
End: 2017-04-27
Payer: MEDICAID

## 2017-04-27 VITALS — DIASTOLIC BLOOD PRESSURE: 80 MMHG | SYSTOLIC BLOOD PRESSURE: 120 MMHG | BODY MASS INDEX: 27.46 KG/M2 | WEIGHT: 155 LBS

## 2017-04-27 DIAGNOSIS — O08.89 PARTIAL MOLAR PREGNANCY: ICD-10-CM

## 2017-04-27 PROCEDURE — 99214 OFFICE O/P EST MOD 30 MIN: CPT | Performed by: OBSTETRICS & GYNECOLOGY

## 2017-04-27 RX ORDER — NORETHINDRONE ACETATE AND ETHINYL ESTRADIOL AND FERROUS FUMARATE 1MG-20(24)
1 KIT ORAL DAILY
Qty: 28 TAB | Refills: 12 | Status: SHIPPED | OUTPATIENT
Start: 2017-04-27 | End: 2018-07-02

## 2017-04-27 NOTE — PROGRESS NOTES
CC: follow up on molar pregnancy    History of present illness: 22 y.o. presents with above chief complaint. Pt is not having any problems at this time. States she is still on pelvic rest. Not having any bleeding. Would like to be on birth control.    Review of systems:  Pertinent positives documented in HPI and a comprehensive review of system is negative as follows:    Constitutional ROS: No unexpected change in weight, No weakness, No fatigue, No unexplained fevers, sweats, or chills  Mouth/Throat ROS: No bleeding gums, No sore throat, No recent change in voice or hoarseness  Neck ROS: No lumps or masses, No swollen glands, No significant pain in neck  Pulmonary ROS: No chronic cough, sputum, or hemoptysis, No dyspnea on exertion, No wheezing, No shortness of breath, No recent change in breathing  Cardiovascular ROS: No exercise intolerance, No chest pain, No shortness of breath, No palpitations, No syncope  Genitourinary ROS: Negative for dysuria, frequency and incontinence  Gastrointestinal ROS: No abdominal pain, No change in bowel habits, No significant change in appetite, No nausea, vomiting, diarrhea, or constipation, No hematemesis, No abdominal bloating or early satiety  Breast ROS: No new breast lumps or masses, No severe breast pain, No nipple discharge  Musculoskeletal/Extremities ROS: No cyanosis, No peripheral edema, No pain, redness or swelling on the joints  Hematologic/Lymphatic ROS: No anemia, No abnormal bleeding, No chills, No bruising, No weight loss  Skin/Integumentary ROS: No evidence of bleeding or bruising, No abnormal skin lesions noted, No evidence of rash, No itching  Neurologic ROS: No chronic headaches, No seizures, No weakness  Psychiatric ROS: No depression, No anxiety, No psychosis    All PMH, PSH, allergies, social history and FH reviewed and updated today:  History reviewed. No pertinent past medical history.    Past Surgical History   Procedure Laterality Date   • Dilation and  curettage  3/17/2017     Procedure: DILATION AND CURETTAGE;  Surgeon: Vic Soler M.D.;  Location: LABOR AND DELIVERY;  Service:        Allergies: No Known Allergies    Social History     Social History   • Marital Status: Single     Spouse Name: N/A   • Number of Children: N/A   • Years of Education: N/A     Occupational History   • Not on file.     Social History Main Topics   • Smoking status: Never Smoker    • Smokeless tobacco: Never Used   • Alcohol Use: No   • Drug Use: No   • Sexual Activity:     Partners: Male     Birth Control/ Protection: Condom      Comment: planned pregnancy, same fob is involved     Other Topics Concern   • Not on file     Social History Narrative       History reviewed. No pertinent family history.    Physical exam:  Blood pressure 120/80, weight 70.308 kg (155 lb), last menstrual period 09/23/2016, unknown if currently breastfeeding.    GENERAL APPEARANCE: healthy, alert, no distress, cooperative  NECK nontender, no masses, thyromegaly or nodules  HEART RRR with normal S1 and S2 ,no murmurs, no gallops, no peripheral edema  LUNG clear to auscultation, normal respiratory effort  ABDOMEN Abdomen soft, non-tender. BS normal. No masses,  No organomegaly  EXTREMITIES:negative clubbing, cyanosis, edema    NEURO Awake, alert and oriented x 3, Normal gait, no sensory deficits  SKIN No rashes, or ulcers or lesions seen  PSYCHIATRIC: Patient shows appropriate affect, is alert and oriented x3, intact judgment and insight.      1. Partial molar pregnancy  HCG QUANTITATIVE    HCG QUANTITATIVE    HCG QUANTITATIVE    Norethin Ace-Eth Estrad-FE 1-20 MG-MCG(24) Tab     Pt instructed not to become pregnant within the year.  Will start OCPs  Use condoms 1st 2 weeks on OCPs  Risks and benefits of OCP discussed  Will perform HCG monthly for next 3 months.  Spent 25 mins face to face time with patient

## 2017-04-27 NOTE — MR AVS SNAPSHOT
Araceli aVle   2017 10:15 AM   Post Partum   MRN: [id number]    Department[facility]  [facility]   Dept Phone:  [phone]    Description:  Female : 1995   Provider:  Yancy Haley M.D.           Allergies as of 2017     No Known Allergies      You were diagnosed with     Partial molar pregnancy   [468660]         Vital Signs     Blood Pressure Weight Last Menstrual Period Smoking Status          120/80 mmHg 70.308 kg (155 lb) 2016 (Exact Date) Never Smoker         Basic Information     Date Of Birth Sex Race Ethnicity Preferred Language    1995 Female White  Origin (Icelandic,Lebanese,Guinean,Sai, etc) English      Problem List              ICD-10-CM Priority Class Noted - Resolved    Short interval between pregnancies  - 7 months O09.891   2016 - Present    Encounter for supervision of other normal pregnancy, first trimester Z34.81   2016 - Present    Abnormal quad screen - MICHELE ONTD 1:22 O28.0   2017 - Present    Fetal anomaly necessitating delivery O35.9XX0   3/2/2017 - Present    Partial molar pregnancy O08.89   3/6/2017 - Present    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 Review all of your home medications and newly ordered medications with your provider and/or pharmacist. Follow medication instructions as directed by your provider and/or pharmacist. Please keep your medication list  with you and share with your provider. Update the information when medications are discontinued, doses are changed, or new medications (including over-the-counter products) are added; and carry medication information at all times in the event of emergency situations     Allergies:  No Known Allergies          Medications  Valid as of: April 27, 2017 - 10:33 AM    Generic Name Brand Name Tablet Size Instructions for use    Norethin Ace-Eth Estrad-FE (Tab) Norethin Ace-Eth Estrad-FE 1-20 MG-MCG(24) Take 1 Tab by mouth every day.        Prenatal Multivit-Min-Fe-FA   Take  by mouth.        .                 Medicines prescribed today were sent to:     [facility] ([place]), [place] - [address]    [address] [facility] [place] [address]    Phone: [phone] Fax: [phone]    Open 24 Hours?: No      Medication refill instructions:       If your prescription bottle indicates you have medication refills left, it is not necessary to call your provider’s office. Please contact your pharmacy and they will refill your medication.    If your prescription bottle indicates you do not have any refills left, you may request refills at any time through one of the following ways: The online [employer] system (except Urgent Care), by calling your provider’s office, or by asking your pharmacy to contact your provider’s office with a refill request. Medication refills are processed only during regular business hours and may not be available until the next business day. Your provider may request additional information or to have a follow-up visit with you prior to refilling your medication.   *Please Note: Medication refills are assigned a new Rx number when refilled electronically. Your pharmacy may indicate that no refills were authorized even though a new prescription for the same medication is available at the pharmacy. Please request the medicine by name with the pharmacy before contacting your provider for a refill.           Your To Do List     Future Labs/Procedures Complete By Expires    HCG QUANTITATIVE  As directed 4/27/2018    HCG QUANTITATIVE  As directed 4/27/2018    HCG QUANTITATIVE  As directed 4/27/2018         MyChart Status: Patient Declined

## 2017-06-02 LAB — HCG INTACT+B SERPL-ACNC: <1 MIU/ML

## 2017-07-26 ENCOUNTER — TELEPHONE (OUTPATIENT)
Dept: OBGYN | Facility: CLINIC | Age: 22
End: 2017-07-26

## 2017-07-26 NOTE — TELEPHONE ENCOUNTER
===========[id number]=================  17 12:42p  ======================================  AW   CLINIC: [facility]  CALL TYPE: General Office Message  TO: Office  NM: Araceli Vale   PH: [phone]   PT NM: Araceli Vale   : 89   REG DR: Ashok   RE: Needs to do blood work     --------------------------------------  Message History  Account: [id number]  Taken:  2017 12:42p   Serial#: [id number]  [id number]      Called pt back pt states she had a molar pregnancy and was told to do blood work to check her hormones levels for a year. Review Dr. Haley notes from 17 and stating pt needs to do Beta Quants one each month [id number]. Pt had one in April, May,  and July. Explained to pt she is done and last results in July is < 2. Explained to pt what she needs to do is not to get pregnant with in a year. Pt agreed and verbalized understanding.

## 2017-12-01 ENCOUNTER — NON-PROVIDER VISIT (OUTPATIENT)
Dept: OBGYN | Facility: CLINIC | Age: 22
End: 2017-12-01
Payer: MEDICAID

## 2017-12-01 DIAGNOSIS — Z32.02 NEGATIVE PREGNANCY TEST: ICD-10-CM

## 2017-12-01 LAB
INT CON NEG: NEGATIVE
INT CON POS: POSITIVE
POC URINE PREGNANCY TEST: NEGATIVE

## 2017-12-01 PROCEDURE — 81025 URINE PREGNANCY TEST: CPT | Performed by: OBSTETRICS & GYNECOLOGY

## 2018-05-22 ENCOUNTER — NON-PROVIDER VISIT (OUTPATIENT)
Dept: OBGYN | Facility: CLINIC | Age: 23
End: 2018-05-22

## 2018-05-22 DIAGNOSIS — Z32.01 POSITIVE URINE PREGNANCY TEST: ICD-10-CM

## 2018-05-22 LAB
INT CON NEG: NEGATIVE
INT CON POS: POSITIVE
POC URINE PREGNANCY TEST: POSITIVE

## 2018-05-22 PROCEDURE — 81025 URINE PREGNANCY TEST: CPT | Performed by: OBSTETRICS & GYNECOLOGY

## 2018-05-25 ENCOUNTER — APPOINTMENT (OUTPATIENT)
Dept: OBGYN | Facility: CLINIC | Age: 23
End: 2018-05-25

## 2018-07-02 ENCOUNTER — INITIAL PRENATAL (OUTPATIENT)
Dept: OBGYN | Facility: CLINIC | Age: 23
End: 2018-07-02
Payer: MEDICAID

## 2018-07-02 VITALS
BODY MASS INDEX: 24.92 KG/M2 | WEIGHT: 146 LBS | DIASTOLIC BLOOD PRESSURE: 70 MMHG | HEIGHT: 64 IN | SYSTOLIC BLOOD PRESSURE: 110 MMHG

## 2018-07-02 DIAGNOSIS — O23.41 UTI IN PREGNANCY, ANTEPARTUM, FIRST TRIMESTER: ICD-10-CM

## 2018-07-02 DIAGNOSIS — Z87.59 HISTORY OF MOLAR PREGNANCY: ICD-10-CM

## 2018-07-02 DIAGNOSIS — O09.891 SUPERVISION OF OTHER HIGH RISK PREGNANCIES, FIRST TRIMESTER: Primary | ICD-10-CM

## 2018-07-02 PROBLEM — O28.0 ABNORMAL QUAD SCREEN: Status: RESOLVED | Noted: 2017-02-08 | Resolved: 2018-07-02

## 2018-07-02 PROBLEM — O08.89 PARTIAL MOLAR PREGNANCY: Status: RESOLVED | Noted: 2017-03-06 | Resolved: 2018-07-02

## 2018-07-02 PROBLEM — O35.9XX0 FETAL ANOMALY NECESSITATING DELIVERY: Status: RESOLVED | Noted: 2017-03-02 | Resolved: 2018-07-02

## 2018-07-02 PROBLEM — O01.1 PARTIAL HYDATIDIFORM MOLE: Status: RESOLVED | Noted: 2017-03-18 | Resolved: 2018-07-02

## 2018-07-02 LAB
APPEARANCE UR: NORMAL
BILIRUB UR STRIP-MCNC: NORMAL MG/DL
C TRACH DNA SPEC QL NAA+PROBE: NEGATIVE
COLOR UR AUTO: NORMAL
GLUCOSE UR STRIP.AUTO-MCNC: NEGATIVE MG/DL
KETONES UR STRIP.AUTO-MCNC: NEGATIVE MG/DL
LEUKOCYTE ESTERASE UR QL STRIP.AUTO: NORMAL
N GONORRHOEA DNA SPEC QL NAA+PROBE: NEGATIVE
NITRITE UR QL STRIP.AUTO: NEGATIVE
PH UR STRIP.AUTO: 5.5 [PH] (ref 5–8)
PROT UR QL STRIP: NORMAL MG/DL
RBC UR QL AUTO: NORMAL
SP GR UR STRIP.AUTO: 1.03
UROBILINOGEN UR STRIP-MCNC: NORMAL MG/DL

## 2018-07-02 PROCEDURE — 81002 URINALYSIS NONAUTO W/O SCOPE: CPT | Performed by: NURSE PRACTITIONER

## 2018-07-02 PROCEDURE — 59402 PR NEW OB HIGH RISK: CPT | Performed by: NURSE PRACTITIONER

## 2018-07-02 RX ORDER — NITROFURANTOIN 25; 75 MG/1; MG/1
100 CAPSULE ORAL 2 TIMES DAILY
Qty: 14 CAP | Refills: 0 | Status: SHIPPED | OUTPATIENT
Start: 2018-07-02 | End: 2018-07-09

## 2018-07-02 NOTE — PROGRESS NOTES
NOB today. sure about LMP  On PNV  Last pap 2/2017=negative  Phone # [phone]  Pharmacy confirmed  No problems today

## 2018-07-02 NOTE — LETTER
Cystic Fibrosis Carrier Testing  Araceli Vale    The following information is about a blood test that can be done to determine if you and/or your partner carry the gene for cystic fibrosis.    WHAT IS CYSTIC FIBROSIS?  · Cystic fibrosis (CF) is an inherited disease that affects more than 25,000 American children and young adults.  · Symptoms of CF vary but include lung congestion, pneumonia, diarrhea and poor growth.  Most people with CF have severe medical problems and some die at a young age.  Others have so few symptoms they are unaware they have CF.  · CF does not affect intelligence.  · Although there is no cure for CF at this time, scientists are making progress in improving treatment and in searching for a cure.  In the past many people with CF  at a very young age.  Today, many are living into their 20’s and 30’s.    IS THERE A CHANCE MY BABY COULD HAVE CYSTIC FIBROSIS?  · You can have a child with CF even if there is no history in your family (see chart below).  · CF testing can help determine if you are a carrier and at risk to have a child with CF.  Note: if both parents are carriers, there is a 1 in 4 (25%) chance with each pregnancy that they will have a child with CF.  · Carriers have one normal CF gene and one altered CF gene.  · People with CF have two altered CF genes.  · Most people have two normal copies of the CF gene.    Approximate risk that a couple with no family history of cystic fibrosis will have a child with cystic fibrosis:    Ethnic background / Risk     couple:  1 in 2,500   couple:  1 in 15,000            couple:  1 in 8,000     American couple:  1 in 32,000     WHAT TESTING IS AVAILABLE?  · There is a blood test that can be done to find out if you or your partner is a carrier.  · It is important to understand that CF carrier testing does not detect all CF carriers.  · If the test shows that you are both CF carriers, you unborn baby can be  tested to find out if the baby has CF.    HOW MUCH DOES IT COST TO HAVE CYSTIC FIBROSIS CARRIER TESTING?  · Cost and insurance coverage for CF carrier testing vary depending upon the laboratory used and your insurance policy.  · The average cost for CF carrier testing is $300 per person.  · Your genetic counselor can provide you with more information about cystic fibrosis carrier testing.    _____  Yes, I am interested in discussing carrier testing with a genetic counselor.    _____  No, I am not interested in CF carrier testing or in receiving more information about CF carrier testing.      Client signature: ________________________________________  7/2/2018

## 2018-07-02 NOTE — PROGRESS NOTES
"Subjective:      S:  Araceli Vale is a 23 y.o.   @ EGA: 11w5d MILLIE: Estimated Date of Delivery: 19  per LNMP who presents for her new OB exam.  She has no complaints.  Desires AFP.  Declines CF.  Reports no FM, VB, LOF, or cramping.  Denies dysuria, vaginal DC.. Not presently working.  Pregnancy is unplanned but wanted.  Not appropriate for Centering Pregnancy. Hx of last pregnancy as a partial molar - she delivered vaginally but needed a D&C afterward.       Initial Prenatal Visit          HPI  Review of Systems   All other systems reviewed and are negative.           Objective:     /70   Ht 1.626 m (5' 4\")   Wt 66.2 kg (146 lb)   LMP 2018   BMI 25.06 kg/m²    Wet mount: defered  FHTs: 160  Fundal ht: 15     Physical Exam   Constitutional: She is oriented to person, place, and time. She appears well-developed and well-nourished.   HENT:   Head: Normocephalic and atraumatic.   Eyes:   Eye and ear exam deferred     Neck: Normal range of motion. Neck supple. No thyromegaly present.   Cardiovascular: Normal rate, regular rhythm, normal heart sounds and intact distal pulses.    Pulmonary/Chest: Effort normal and breath sounds normal. Right breast exhibits no inverted nipple, no mass, no nipple discharge, no skin change and no tenderness. Left breast exhibits no inverted nipple, no mass, no nipple discharge, no skin change and no tenderness.   Abdominal: Soft. Bowel sounds are normal.   Genitourinary: Vagina normal and uterus normal. Pelvic exam was performed with patient supine. There is no rash, tenderness, lesion or injury on the right labia. There is no rash, tenderness, lesion or injury on the left labia. Cervix exhibits no motion tenderness, no discharge and no friability. Right adnexum displays no mass, no tenderness and no fullness. Left adnexum displays no mass, no tenderness and no fullness.   Genitourinary Comments: NSVDx2 - proven to 5#1   Musculoskeletal: Normal range of " motion.   Neurological: She is alert and oriented to person, place, and time.   Skin: Skin is warm and dry. Capillary refill takes less than 2 seconds.   Psychiatric: She has a normal mood and affect. Her behavior is normal. Judgment and thought content normal.   Nursing note and vitals reviewed.           A:   1.  IUP @ 11w5d MILLIE: Estimated Date of Delivery: 1/16/19 per LN         2.  S>D  Patient Active Problem List   Diagnosis   • History of molar pregnancy   • Supervision of other high risk pregnancies, first trimester   • UTI in pregnancy, antepartum, first trimester        P:  1.  GC/CT done. Pap wnl, UTD - deferred.         2.  Prenatal labs ordered - lab slip given        3.  Discussed PNV, diet, and adequate water intake        4.  NOB packet given        5.  Return to office in 4 wks        6.  Referral sent to [facility].         7.  Rx sent for macrobid.    Consulted w Dr. Barclay regarding pt and POC.

## 2018-07-03 NOTE — PROGRESS NOTES
Referral faxed to DR. Soler on 7/3/18.  They will contact patient to schedule appt.  Please check with patient if appt was made/ document. Thank you

## 2018-07-06 LAB
ABO GROUP BLD: NORMAL
BLD GP AB SCN SERPL QL: NEGATIVE
HBV SURFACE AG SERPL QL IA: NON REACTIVE
HCT VFR BLD AUTO: 40.7 %
HGB BLD-MCNC: 13.2 G/DL
HIV 1 0 2 IC ZHVIC: NON REACTIVE
PLATELET # BLD AUTO: 238 10*3/UL
RH BLD: POSITIVE
RPR SER QL: NORMAL
RUBV IGG SERPL IA-ACNC: NORMAL

## 2018-08-03 ENCOUNTER — ROUTINE PRENATAL (OUTPATIENT)
Dept: OBGYN | Facility: CLINIC | Age: 23
End: 2018-08-03
Payer: MEDICAID

## 2018-08-03 VITALS — DIASTOLIC BLOOD PRESSURE: 58 MMHG | WEIGHT: 147 LBS | SYSTOLIC BLOOD PRESSURE: 98 MMHG | BODY MASS INDEX: 25.23 KG/M2

## 2018-08-03 DIAGNOSIS — O09.891 SUPERVISION OF OTHER HIGH RISK PREGNANCIES, FIRST TRIMESTER: Primary | ICD-10-CM

## 2018-08-03 DIAGNOSIS — O23.41 UTI IN PREGNANCY, ANTEPARTUM, FIRST TRIMESTER: ICD-10-CM

## 2018-08-03 DIAGNOSIS — Z87.59 HISTORY OF MOLAR PREGNANCY: ICD-10-CM

## 2018-08-03 PROCEDURE — 90040 PR PRENATAL FOLLOW UP: CPT | Performed by: NURSE PRACTITIONER

## 2018-08-03 NOTE — PROGRESS NOTES
S: Pt is  at 16w2d here for routine OB follow up.  No c/o today.  Reports good FM.  Denies VB, LOF,  RUCs or vaginal DC.     O:  Please see above vitals        FHTs: 155        Fundal ht: 16 cm         Pap smear: wnl    A: IUP 16w2d  Patient Active Problem List    Diagnosis Date Noted   • History of molar pregnancy 2018   • Supervision of other high risk pregnancies, first trimester 2018   • UTI in pregnancy, antepartum, first trimester 2018       P:  1.  Reviewed labs w pt.        2.  Desires AFP.        3.  US is w/Oki.        4.  Questions answered.        5.  Encouraged adequate water intake.        6.  F/u 4 wks.

## 2018-08-03 NOTE — PROGRESS NOTES
Pt here for OB follow Up  Pt states no complaints  +FM feeling flutters  Good # [phone]   Pharmacy confirmed  Pt requested AFP

## 2018-08-07 LAB — 2ND TRIMESTER 4 SCREEN SERPL-IMP: NEGATIVE

## 2018-08-09 ENCOUNTER — TELEPHONE (OUTPATIENT)
Dept: OBGYN | Facility: CLINIC | Age: 23
End: 2018-08-09

## 2018-08-31 ENCOUNTER — ROUTINE PRENATAL (OUTPATIENT)
Dept: OBGYN | Facility: CLINIC | Age: 23
End: 2018-08-31
Payer: MEDICAID

## 2018-08-31 VITALS — WEIGHT: 149 LBS | BODY MASS INDEX: 25.58 KG/M2 | SYSTOLIC BLOOD PRESSURE: 100 MMHG | DIASTOLIC BLOOD PRESSURE: 60 MMHG

## 2018-08-31 DIAGNOSIS — Z87.59 HISTORY OF MOLAR PREGNANCY: ICD-10-CM

## 2018-08-31 DIAGNOSIS — O23.41 UTI IN PREGNANCY, ANTEPARTUM, FIRST TRIMESTER: ICD-10-CM

## 2018-08-31 PROCEDURE — 90040 PR PRENATAL FOLLOW UP: CPT | Performed by: NURSE PRACTITIONER

## 2018-08-31 NOTE — PROGRESS NOTES
S:  Pt is  at 20w2d here for routine OB follow up.  Says she fell on Monday, reports no CTXs or bleeding.  Reports good FM.  Denies VB, LOF, RUCs, or vaginal DC.     O:  Please see above vitals.        FHTs: 154        Fundal ht: 20 cm.        AFP: negative -- reviewed w pt.        MFM report: normal fetal survey, no f/u needed      A:  IUP 20w2d  Patient Active Problem List    Diagnosis Date Noted   • History of molar pregnancy 2018   • Supervision of other high risk pregnancies, first trimester 2018   • UTI in pregnancy, antepartum, first trimester 2018        P:  1.  Reviewed labs, ultrasound w pt.          2.  Questions answered.          3.  Encouraged adequate water intake        4.  F/u 4 wks.

## 2018-08-31 NOTE — PROGRESS NOTES
Pt here today for OB follow up  Pt states fell on Wednesday after tripping over daughters toy motorcycle and hit abdomen. Denies VB, did have mild cramping.   Reports +FM  Good # [phone]   Pharmacy Confirmed.  Saw Dr. Soler 8/24-no f/u needed.

## 2018-09-28 ENCOUNTER — ROUTINE PRENATAL (OUTPATIENT)
Dept: OBGYN | Facility: CLINIC | Age: 23
End: 2018-09-28
Payer: MEDICAID

## 2018-09-28 VITALS — DIASTOLIC BLOOD PRESSURE: 56 MMHG | WEIGHT: 153 LBS | BODY MASS INDEX: 26.26 KG/M2 | SYSTOLIC BLOOD PRESSURE: 98 MMHG

## 2018-09-28 DIAGNOSIS — O09.891 SUPERVISION OF OTHER HIGH RISK PREGNANCIES, FIRST TRIMESTER: Primary | ICD-10-CM

## 2018-09-28 PROCEDURE — 90040 PR PRENATAL FOLLOW UP: CPT | Performed by: NURSE PRACTITIONER

## 2018-09-28 NOTE — PROGRESS NOTES
S) Pt is a 23 y.o.   at 24w2d  gestation. Routine prenatal care today. Pt states had 1 or 2 BH ctx over the last couple of weeks and felt some pressure with them. Reassurance provided and discussed PTL precautions .    Fetal movement Normal  Cramping no  VB no  LOF no   Denies dysuria. Generally feels well today. Good self-care activities identified. Denies headaches, swelling, visual changes, or epigastric pain .     O) Blood pressure (!) 98/56, weight 69.4 kg (153 lb), last menstrual period 2018, unknown if currently breastfeeding.        Labs: given req today       PNL: WNL       GCT: given req today       AFP: normal       GBS: N/A       Pertinent ultrasound - 18 with Dr Soler, survey WNL, consistent with previous dating, WILLIAMS normal           A) IUP at 24w2d       S=D         Patient Active Problem List    Diagnosis Date Noted   • History of molar pregnancy 2018   • Supervision of other high risk pregnancies, first trimester 2018   • UTI in pregnancy, antepartum, first trimester 2018          SVE: deferred         TDAP: no       FLU: no        BTL: no       : no       C/S Consent: no       IOL or C/S scheduled: no       LAST PAP:          P) s/s ptl vs general discomforts.   Fetal movements reviewed. General ed and anticipatory guidance. Nutrition/exercise/vitamin. Plans breast Plans pp contraception- Condoms     3rd trimester lab reqs given.  RTC 4 weeks or PRN.

## 2018-09-28 NOTE — PROGRESS NOTES
Pt here today for OB follow up  Pt states having pelvic pressure   Reports +FM  Good # [phone]   Pharmacy Confirmed.  Chaperone offered and not indicated .   Pt given 1 hr GTT and instructions.

## 2018-10-14 ENCOUNTER — HOSPITAL ENCOUNTER (OUTPATIENT)
Facility: MEDICAL CENTER | Age: 23
End: 2018-10-14
Attending: OBSTETRICS & GYNECOLOGY | Admitting: OBSTETRICS & GYNECOLOGY
Payer: MEDICAID

## 2018-10-14 VITALS
WEIGHT: 158 LBS | RESPIRATION RATE: 18 BRPM | HEART RATE: 73 BPM | HEIGHT: 64 IN | BODY MASS INDEX: 26.98 KG/M2 | DIASTOLIC BLOOD PRESSURE: 69 MMHG | SYSTOLIC BLOOD PRESSURE: 117 MMHG

## 2018-10-14 LAB
APPEARANCE UR: CLEAR
BACTERIA #/AREA URNS HPF: NEGATIVE /HPF
BILIRUB UR QL STRIP.AUTO: NEGATIVE
COLOR UR: YELLOW
EPI CELLS #/AREA URNS HPF: ABNORMAL /HPF
GLUCOSE UR STRIP.AUTO-MCNC: 250 MG/DL
HYALINE CASTS #/AREA URNS LPF: ABNORMAL /LPF
KETONES UR STRIP.AUTO-MCNC: NEGATIVE MG/DL
LEUKOCYTE ESTERASE UR QL STRIP.AUTO: ABNORMAL
MICRO URNS: ABNORMAL
NITRITE UR QL STRIP.AUTO: NEGATIVE
PH UR STRIP.AUTO: 6 [PH]
PROT UR QL STRIP: NEGATIVE MG/DL
RBC # URNS HPF: ABNORMAL /HPF
RBC UR QL AUTO: NEGATIVE
SP GR UR STRIP.AUTO: 1.01
UROBILINOGEN UR STRIP.AUTO-MCNC: 0.2 MG/DL
WBC #/AREA URNS HPF: ABNORMAL /HPF

## 2018-10-14 PROCEDURE — 87086 URINE CULTURE/COLONY COUNT: CPT

## 2018-10-14 PROCEDURE — 81001 URINALYSIS AUTO W/SCOPE: CPT

## 2018-10-14 NOTE — PROGRESS NOTES
1425 - Patient of [facility] presents with c/o decreased FM today. Argueta Gestation- 26.4 weeks    Denies contractions/cramping, Denies problems with Pregnancy, denies ROM or Bleeding. Denies change to vision/edema/HA, Reports FM on arrival. FM/TOCO use discussed and placed, POC discussed.Patient encouraged to call RN with all questions concerns needs prn.    6367 - Report to Dr. Guerrier regarding patient arrival/complaint/status. Patient discharged home with specific instruction to return to L&D/Physician ie.. Bleeding/ROM/decreased FM/labor/concerns for self or baby.

## 2018-10-15 ENCOUNTER — TELEPHONE (OUTPATIENT)
Dept: OBGYN | Facility: CLINIC | Age: 23
End: 2018-10-15

## 2018-10-15 LAB
GLUCOSE TOL INTERP 786: 98
HCT VFR BLD AUTO: 36.2 %
HCT VFR BLD AUTO: 36.2 %
HGB BLD-MCNC: 12.4 G/DL
HGB BLD-MCNC: 12.4 G/DL
RPR SER QL: NOT DETECTED

## 2018-10-15 NOTE — TELEPHONE ENCOUNTER
Pt went to L&D on 10/14/18 due to decreased of fetal movement.  States was advised to f/u w/us, reports good FM now, denies other complications.   Advised to drink cold water or ice, a meal if she feel that baby is not moving laid down and alternate position.  Labor precautions given.  Verbalized understanding.

## 2018-10-16 LAB
BACTERIA UR CULT: NORMAL
SIGNIFICANT IND 70042: NORMAL
SITE SITE: NORMAL
SOURCE SOURCE: NORMAL

## 2018-11-02 ENCOUNTER — ROUTINE PRENATAL (OUTPATIENT)
Dept: OBGYN | Facility: CLINIC | Age: 23
End: 2018-11-02
Payer: MEDICAID

## 2018-11-02 VITALS — DIASTOLIC BLOOD PRESSURE: 60 MMHG | BODY MASS INDEX: 27.98 KG/M2 | WEIGHT: 163 LBS | SYSTOLIC BLOOD PRESSURE: 106 MMHG

## 2018-11-02 DIAGNOSIS — O09.893 SUPERVISION OF OTHER HIGH RISK PREGNANCIES, THIRD TRIMESTER: Primary | ICD-10-CM

## 2018-11-02 PROCEDURE — 90471 IMMUNIZATION ADMIN: CPT | Performed by: NURSE PRACTITIONER

## 2018-11-02 PROCEDURE — 90715 TDAP VACCINE 7 YRS/> IM: CPT | Performed by: NURSE PRACTITIONER

## 2018-11-02 PROCEDURE — 90040 PR PRENATAL FOLLOW UP: CPT | Performed by: NURSE PRACTITIONER

## 2018-11-02 PROCEDURE — 90686 IIV4 VACC NO PRSV 0.5 ML IM: CPT | Performed by: NURSE PRACTITIONER

## 2018-11-02 PROCEDURE — 90472 IMMUNIZATION ADMIN EACH ADD: CPT | Performed by: NURSE PRACTITIONER

## 2018-11-02 NOTE — PROGRESS NOTES
Pt here today for OB follow up  Reports +FM  WT: 163 lb  BP: 106/60  GERSON sheet given and explained today  Desires Tdap vaccine  Desires the influenza vaccine  Declines BTL  Pt states no complaints today  Good # [phone]    Tdap vaccine given.  Deltoid. VIS given and screening check list reviewed with pt.  Influenza vaccine given. Left Deltoid. VIS given and screening check list reviewed with pt.  Tdap and Influenza vaccine were verified by Meghana Arredondo D.N.P.

## 2018-11-02 NOTE — LETTER
"Count Your Baby's Movements  Another step to a healthy delivery    Araceli Vale             Dept: [phone]    How Many Weeks Pregnant? 29W2D    Date to Begin Counting: Today 11/02/2018              How to use this chart    One way for your physician to keep track of your baby's health is by knowing how often the baby moves (or \"kicks\") in your womb.  You can help your physician to do this by using this chart every day.    Every day, you should see how many hours it takes for your baby to move 10 times.  Start in the morning, as soon as you get up.    · First, write down the time your baby moves until you get to 10.  · Check off one box every time your baby moves until you get to 10.  · Write down the time you finished counting in the last column.  · Total how long it took to count up all 10 movements.  · Finally, fill in the box that shows how long this took.  After counting 10 movements, you no longer have to count any more that day.  The next morning, just start counting again as soon as you get up.    What should you call a \"movement\"?  It is hard to say, because it will feel different from one mother to another and from one pregnancy to the next.  The important thing is that you count the movements the same way throughout your pregnancy.  If you have more questions, you should ask your physician.    Count carefully every day!  SAMPLE:  Week 28    How many hours did it take to feel 10 movements?       Start  Time     1     2     3     4     5     6     7     8     9     10   Finish Time   Mon 8:20 ·  ·  ·  ·  ·  ·  ·  ·  ·  ·  11:40   Tue Wed Thu Fri               Sat               Sun                 IMPORTANT: You should contact your physician if it takes more than two hours for you to feel 10 movements.  Each morning, write down the time and start to count the movements of your baby.  Keep track by checking off one box every time you feel one movement.  When you " "have felt 10 \"kicks\", write down the time you finished counting in the last column.  Then fill in the   box (over the check tobias) for the number of hours it took.  Be sure to read the complete instructions on the previous page.            "

## 2018-11-02 NOTE — PROGRESS NOTES
SUBJECTIVE:  Pt is a 23 y.o.   at 29w2d  gestation. Presents today for follow-up prenatal care. Reports no issues at this time.  Reports good  fetal movement. Denies cramping/contractions, bleeding or leaking of fluid. Denies dysuria, headaches, N/V, or other issues at this time. Generally feels well today.     OBJECTIVE:  - See prenatal vitals flow  -   Vitals:    18 0831   BP: 106/60   Weight: 73.9 kg (163 lb)      Labs - normal prenatal panel, normal glucose. Normal AFP  US Dr. Karlene marcelino.            ASSESSMENT:   - IUP at 29w2d    - S=D   -   Patient Active Problem List    Diagnosis Date Noted   • History of molar pregnancy 2018   • Supervision of other high risk pregnancies, first trimester 2018   • UTI in pregnancy, antepartum, first trimester 2018         PLAN:  - S/sx pregnancy and labor warning signs vs general discomforts discussed  - Fetal movements and kick counts reviewed   - Adequate hydration reinforced  - Nutrition/exercise/vitamin education; continued PNV  Flu and tdap today.   Desires breast feeding. Unsure yet about contraception.   -

## 2018-11-16 ENCOUNTER — ROUTINE PRENATAL (OUTPATIENT)
Dept: OBGYN | Facility: CLINIC | Age: 23
End: 2018-11-16
Payer: MEDICAID

## 2018-11-16 VITALS — WEIGHT: 164.7 LBS | DIASTOLIC BLOOD PRESSURE: 72 MMHG | SYSTOLIC BLOOD PRESSURE: 108 MMHG | BODY MASS INDEX: 28.27 KG/M2

## 2018-11-16 DIAGNOSIS — O09.891 SUPERVISION OF OTHER HIGH RISK PREGNANCIES, FIRST TRIMESTER: Primary | ICD-10-CM

## 2018-11-16 PROCEDURE — 90040 PR PRENATAL FOLLOW UP: CPT | Performed by: NURSE PRACTITIONER

## 2018-11-16 NOTE — PROGRESS NOTES
OB f/u. + fetal movement.  No VB, LOF or UC's.  Wt:  164.7    BP:108/72  Good phone # [phone]  Preferred pharmacy confirmed.  Labs done

## 2018-11-16 NOTE — PROGRESS NOTES
S) Pt is a 23 y.o.   at 31w2d  gestation. Routine prenatal care today. Pt seen in L&D last month for decreased FM.  States she has not had any more concerns for this.    Fetal movement Normal  Cramping no  VB no  LOF no   Denies dysuria. Generally feels well today. Good self-care activities identified. Denies headaches, swelling, visual changes, or epigastric pain .     O) Blood pressure 108/72, weight 74.7 kg (164 lb 11.2 oz), last menstrual period 2018, unknown if currently breastfeeding.        Labs: WNL       PNL: WNL       GCT: 98       AFP: normal       GBS: N/A       Pertinent ultrasound - 18 with Dr Soler, survey WNL, consistent with previous dating, WILLIAMS normal           A) IUP at 31w2d       S=D         Patient Active Problem List    Diagnosis Date Noted   • History of molar pregnancy 2018   • Supervision of other high risk pregnancies, first trimester 2018   • UTI in pregnancy, antepartum, first trimester 2018          SVE: deferred         TDAP: yes       FLU: yes        BTL: no       : no       C/S Consent: no       IOL or C/S scheduled: no       LAST PAP: 2017 negative         P) s/s ptl vs general discomforts.   Fetal movements reviewed.   General ed and anticipatory guidance. Nutrition/exercise/vitamin. Plans breast Plans pp contraception- unsure-info sheet given  RTC 2 weeks or PRN

## 2018-11-30 ENCOUNTER — ROUTINE PRENATAL (OUTPATIENT)
Dept: OBGYN | Facility: CLINIC | Age: 23
End: 2018-11-30
Payer: MEDICAID

## 2018-11-30 VITALS — DIASTOLIC BLOOD PRESSURE: 60 MMHG | SYSTOLIC BLOOD PRESSURE: 108 MMHG | BODY MASS INDEX: 29.52 KG/M2 | WEIGHT: 172 LBS

## 2018-11-30 DIAGNOSIS — O09.891 SUPERVISION OF OTHER HIGH RISK PREGNANCIES, FIRST TRIMESTER: Primary | ICD-10-CM

## 2018-11-30 PROCEDURE — 90040 PR PRENATAL FOLLOW UP: CPT | Performed by: NURSE PRACTITIONER

## 2018-11-30 NOTE — PROGRESS NOTES
OB F/U  Denies VB, LOF, or UC  +FM  Phone#:[phone]  Pharmacy Confirmed.  C/O: groin pain, which worsen when climbing on bed.

## 2018-11-30 NOTE — PROGRESS NOTES
S) Pt is a 23 y.o.   at 33w2d  gestation. Routine prenatal care today. Pt c/o some pelvic discomfort around her pubic bone, especially when shes getting into/out of bed.    Fetal movement Normal  Cramping no  VB no  LOF no   Denies dysuria. Generally feels well today. Good self-care activities identified. Denies headaches, swelling, visual changes, or epigastric pain .     O) Blood pressure 108/60, weight 78 kg (172 lb), last menstrual period 2018, unknown if currently breastfeeding.        Labs WNL:       PNL: WNL       GCT: 98       AFP: normal       GBS: N/A       Pertinent ultrasound - 18 with Dr Soler, survey WNL, consistent with previous dating, WILLIAMS normal           A) IUP at 33w2d       S=D         Patient Active Problem List    Diagnosis Date Noted   • History of molar pregnancy 2018   • Supervision of other high risk pregnancies, first trimester 2018   • UTI in pregnancy, antepartum, first trimester 2018          SVE: deferred         TDAP: yes       FLU: yes        BTL: no       : no       C/S Consent: no       IOL or C/S scheduled: no       LAST PAP: 2017 negative         P) s/s ptl vs general discomforts. Fetal movements reviewed. General ed and anticipatory guidance. Nutrition/exercise/vitamin. Plans breast Plans pp contraception- unsure-given sheet and discussed options, maybe depo.    GBS next visit.  Discussed tylenol, heat, ice, stretching for pelvic pain  RTC 2 weeks or PRN.

## 2018-12-14 ENCOUNTER — ROUTINE PRENATAL (OUTPATIENT)
Dept: OBGYN | Facility: CLINIC | Age: 23
End: 2018-12-14
Payer: MEDICAID

## 2018-12-14 VITALS — BODY MASS INDEX: 29.35 KG/M2 | SYSTOLIC BLOOD PRESSURE: 110 MMHG | DIASTOLIC BLOOD PRESSURE: 60 MMHG | WEIGHT: 171 LBS

## 2018-12-14 DIAGNOSIS — O09.893 SUPERVISION OF OTHER HIGH RISK PREGNANCIES, THIRD TRIMESTER: Primary | ICD-10-CM

## 2018-12-14 LAB — STREP GP B DNA PCR: NEGATIVE

## 2018-12-14 PROCEDURE — 90040 PR PRENATAL FOLLOW UP: CPT | Performed by: NURSE PRACTITIONER

## 2018-12-14 NOTE — PROGRESS NOTES
S) Pt is a 23 y.o.   at 35w2d  gestation. Routine prenatal care today. Pt without complaints today but feels as though baby dropped more since last week.  Denies contractions but having some round ligament pain, especially when up and moving alot  Fetal movement Normal  Cramping no  VB no  LOF no   Denies dysuria. Generally feels well today. Good self-care activities identified. Denies headaches, swelling, visual changes, or epigastric pain .     O) Blood pressure 110/60, weight 77.6 kg (171 lb), last menstrual period 2018, unknown if currently breastfeeding.        Labs: WNL       PNL: WNL       GCT:98       AFP: normal       GBS: collected today       Pertinent ultrasound -        18 with Dr Soler, survey WNL, consistent with previous dating, WILLIAMS normal    A) IUP at 35w2d       S<D         Patient Active Problem List    Diagnosis Date Noted   • History of molar pregnancy 2018   • Supervision of other high risk pregnancies, first trimester 2018   • UTI in pregnancy, antepartum, first trimester 2018          SVE:deferred         TDAP: yes       FLU: yes        BTL: no       : no       C/S Consent: no       IOL or C/S scheduled: no       LAST PAP: 2017 negative         P) s/s ptl vs general discomforts. Fetal movements reviewed. General ed and anticipatory guidance. Nutrition/exercise/vitamin. Plans breast Plans pp contraception- unsure    Watch [facility] next visit.  GBS collected today.  Encouraged increase water and protein.   RTC 1 week or PRN

## 2018-12-14 NOTE — PROGRESS NOTES
OB f/u. + fetal movement.  No VB, LOF or UC's.  Wt:171lb     BP: 110/60  Good phone # [phone]  Preferred pharmacy confirmed.  GBS today  Chaperone offered, patient declined

## 2018-12-21 ENCOUNTER — ROUTINE PRENATAL (OUTPATIENT)
Dept: OBGYN | Facility: CLINIC | Age: 23
End: 2018-12-21
Payer: MEDICAID

## 2018-12-21 VITALS — BODY MASS INDEX: 29.87 KG/M2 | DIASTOLIC BLOOD PRESSURE: 58 MMHG | SYSTOLIC BLOOD PRESSURE: 112 MMHG | WEIGHT: 174 LBS

## 2018-12-21 DIAGNOSIS — O26.849 UTERINE SIZE-DATE DISCREPANCY, ANTEPARTUM: ICD-10-CM

## 2018-12-21 PROCEDURE — 90040 PR PRENATAL FOLLOW UP: CPT | Performed by: NURSE PRACTITIONER

## 2018-12-21 NOTE — PROGRESS NOTES
SUBJECTIVE:  Pt is a 23 y.o.   at 36w2d  gestation. Presents today for follow-up prenatal care. Reports no issues at this time.  Reports good  fetal movement. Denies cramping/contractions, bleeding or leaking of fluid. Denies dysuria, headaches, N/V, or other issues at this time. Generally feels well today.     OBJECTIVE:  - See prenatal vitals flow  -   Vitals:    18 1140   BP: 112/58   Weight: 78.9 kg (174 lb)      Labs - normal fetal survey            ASSESSMENT:   - IUP at 36w2d    - S=D   -   Patient Active Problem List    Diagnosis Date Noted   • History of molar pregnancy 2018   • Supervision of other high risk pregnancies, first trimester 2018   • UTI in pregnancy, antepartum, first trimester 2018   Questionable interval growth      PLAN:  - S/sx pregnancy and labor warning signs vs general discomforts discussed  - Fetal movements and kick counts reviewed   - Adequate hydration reinforced  - Nutrition/exercise/vitamin education; continued PNV  - fit in for US growth.

## 2018-12-21 NOTE — PROGRESS NOTES
Pt here today for OB follow up  Reports +FM  WT: 174 lb  BP: 112/58  Pt states no complaints today  Good # [phone]

## 2018-12-26 ENCOUNTER — HOSPITAL ENCOUNTER (OUTPATIENT)
Dept: RADIOLOGY | Facility: MEDICAL CENTER | Age: 23
End: 2018-12-26
Attending: NURSE PRACTITIONER
Payer: MEDICAID

## 2018-12-26 ENCOUNTER — DATING (OUTPATIENT)
Dept: OBGYN | Facility: CLINIC | Age: 23
End: 2018-12-26

## 2018-12-26 DIAGNOSIS — O26.849 UTERINE SIZE-DATE DISCREPANCY, ANTEPARTUM: ICD-10-CM

## 2018-12-26 PROCEDURE — 76816 OB US FOLLOW-UP PER FETUS: CPT

## 2018-12-27 ENCOUNTER — DATING (OUTPATIENT)
Dept: OBGYN | Facility: CLINIC | Age: 23
End: 2018-12-27

## 2018-12-28 ENCOUNTER — ROUTINE PRENATAL (OUTPATIENT)
Dept: OBGYN | Facility: CLINIC | Age: 23
End: 2018-12-28
Payer: OTHER MISCELLANEOUS

## 2018-12-28 VITALS — BODY MASS INDEX: 30.38 KG/M2 | DIASTOLIC BLOOD PRESSURE: 76 MMHG | WEIGHT: 177 LBS | SYSTOLIC BLOOD PRESSURE: 112 MMHG

## 2018-12-28 DIAGNOSIS — O09.93 HIGH-RISK PREGNANCY SUPERVISION, THIRD TRIMESTER: ICD-10-CM

## 2018-12-28 PROCEDURE — 90040 PR PRENATAL FOLLOW UP: CPT | Performed by: OBSTETRICS & GYNECOLOGY

## 2018-12-28 NOTE — PROGRESS NOTES
Pt here today for OB follow up  Reports +FM  WT: 177 lb  BP:  112/76  Pt states no complaints today  Good # [phone]

## 2018-12-28 NOTE — PROGRESS NOTES
S: Pt presents for routine OB follow up.  Reports normal kick counts /good fetal movements.  No contractions, vaginal bleeding, or leakage of fluid.    Questions answered.    O: /76   Wt 80.3 kg (177 lb)   LMP 2018   BMI 30.38 kg/m²   Patients' weight gain, fluid intake and exercise level discussed.  Vitals, fundal height , fetal position, and FHR reviewed on flowsheet    Lab:No results found for this or any previous visit (from the past 336 hour(s)).     Ultrasound results reviewed with patient and her spouse.  HISTORY/REASON FOR EXAM:  Sizes less than dates      TECHNIQUE/EXAM DESCRIPTION: OB limited growth follow up ultrasound.    COMPARISON:  None    FINDINGS:  Fetal Lie:  Vertex  LMP:  2018  Clinical MILLIE by LMP:  2019    Placenta (Location):  Posterior  Placenta Previa: No  Placental Grade: I    Amniotic Fluid Volume:  WILLIAMS = 13.3 cm    Fetal Heart Rate:  123 bpm    No maternal adnexal mass is identified.    Fetal Biometry  BPD    8.87 cm, 35w 6d  HC    31.15 cm, 34w 6d  AC    31.80 cm, 35w 5d  Femur Length    6.96 cm, 35w 5d  Humerus Length    6.10 cm, 35w 2d    EGA by this US:  35w 3d  MILLIE by this US: 2019  MILLIE by 1st US:  2019    Estimated Fetal Weight:  2729 grams    Comments:  There are no gross fetal anomalies evident.   Impression       1.  Single intrauterine pregnancy of an estimated gestational age of 35w 3d with an estimated date of delivery of 2019.         A/P:  23 y.o.  at 37w2d presents for routine obstetric follow-up.  Size equals dates   Patient has size less than date measurement at her prior visit and had a growth ultrasound.  Growth ultrasound shows appropriate growth and WILLIAMS.  Patient was reassured.    1.  Continue prenatal vitamins.  2.  Fetal kick counts.  3.  Exercise at least 30 minutes daily.  4.  Drink at least 2L of water daily  5.  Pregnancy/Labor precautions educated.  6.  Follow-up in 1 week.  7.  GBS neg discussed

## 2019-01-04 ENCOUNTER — ROUTINE PRENATAL (OUTPATIENT)
Dept: OBGYN | Facility: CLINIC | Age: 24
End: 2019-01-04
Payer: OTHER MISCELLANEOUS

## 2019-01-04 VITALS — WEIGHT: 175 LBS | BODY MASS INDEX: 30.04 KG/M2 | DIASTOLIC BLOOD PRESSURE: 70 MMHG | SYSTOLIC BLOOD PRESSURE: 130 MMHG

## 2019-01-04 DIAGNOSIS — O09.893 SUPERVISION OF OTHER HIGH RISK PREGNANCIES, THIRD TRIMESTER: ICD-10-CM

## 2019-01-04 PROBLEM — O23.41 UTI IN PREGNANCY, ANTEPARTUM, FIRST TRIMESTER: Status: RESOLVED | Noted: 2018-07-02 | Resolved: 2019-01-04

## 2019-01-04 PROCEDURE — 90040 PR PRENATAL FOLLOW UP: CPT | Performed by: NURSE PRACTITIONER

## 2019-01-04 NOTE — PROGRESS NOTES
SUBJECTIVE:  Pt is a 23 y.o.   at 38w2d  gestation. Presents today for follow-up prenatal care. Reports no issues at this time.  Reports good fetal movement. Denies cramping/contractions, bleeding or leaking of fluid. Denies dysuria, headaches, N/V, or other issues at this time. Generally feels well today.     OBJECTIVE:  - See prenatal vitals flow  -   Vitals:    19 1505   BP: 142/68   Weight: 79.4 kg (175 lb)                 ASSESSMENT:   - IUP at 38w2d    - S=D   -   Patient Active Problem List    Diagnosis Date Noted   • History of molar pregnancy 2018   • Supervision of other high risk pregnancies, third trimester 2018   • UTI in pregnancy, antepartum, first trimester 2018         PLAN:  - S/sx pregnancy and labor warning signs vs general discomforts discussed  - Fetal movements and kick counts reviewed   - Adequate hydration reinforced  - Nutrition/exercise/vitamin education; continued PNV  - Plans for breastfeeding  - Plans unsure for contraception Pp: handout given and reviewed  - S/p TDAP vacc   - S/p Flu vacc   - Anticipatory guidance given  - RTC in 1 weeks for follow-up prenatal care  -Pt rushed into appt and breathing heavy and BP was elevated but repeat WNL

## 2019-01-04 NOTE — PROGRESS NOTES
Pt here today for OB follow up  Pt states no complaints   Reports +FM  Good # [phone]  Pharmacy Confirmed.  Chaperone offered and declined.   GBS negative

## 2019-01-09 ENCOUNTER — ROUTINE PRENATAL (OUTPATIENT)
Dept: OBGYN | Facility: CLINIC | Age: 24
End: 2019-01-09
Payer: OTHER MISCELLANEOUS

## 2019-01-09 VITALS — BODY MASS INDEX: 30.9 KG/M2 | SYSTOLIC BLOOD PRESSURE: 122 MMHG | WEIGHT: 180 LBS | DIASTOLIC BLOOD PRESSURE: 72 MMHG

## 2019-01-09 DIAGNOSIS — O09.893 SUPERVISION OF OTHER HIGH RISK PREGNANCIES, THIRD TRIMESTER: Primary | ICD-10-CM

## 2019-01-09 PROCEDURE — 90040 PR PRENATAL FOLLOW UP: CPT | Performed by: NURSE PRACTITIONER

## 2019-01-09 NOTE — PATIENT INSTRUCTIONS
P:  1.  Continue FKCs.         2.  Labor precautions given.  Instructions given on where to go.  Pt receptive to education.         3.  D/w pt IOL policy.  IOL referral placed.        4.  Questions answered.         5.  Encouraged adequate water intake       6.  F/u 1wk       7.  PP contraception: depo or Nexplanon.

## 2019-01-09 NOTE — PROGRESS NOTES
S:  Pt is  at 39w0d here for routine OB follow up.  Reports some slower FM today, but has been busy - notes normal movement once she's sitting down.  Reports good FM.  Denies VB, LOF, RUCs, or vaginal DC.     O:  Please see above vitals.        FHTs: 135        Fundal ht: 36        Fetal position: vertex        SVE: cl/th/-2        GBS neg on 18 -- reviewed w pt.      A:  IUP at 39w0d  Patient Active Problem List    Diagnosis Date Noted   • History of molar pregnancy 2018   • Supervision of other high risk pregnancies, third trimester 2018   • UTI in pregnancy, antepartum, first trimester 2018       P:  1.  Continue FKCs.         2.  Labor precautions given.  Instructions given on where to go.  Pt receptive to education.         3.  D/w pt IOL policy.  IOL referral placed.        4.  Questions answered.         5.  Encouraged adequate water intake       6.  F/u 1wk       7.  PP contraception: depo or Nexplanon.    Chaperone offered and provided by Philomena Gutierrez MA.

## 2019-01-09 NOTE — PROGRESS NOTES
Pt here today for OB follow up  Negative GBS, pt aware  Pt states baby has not been as active today as usual  WT: 180 lb  BP: 122/72  Pt states no complaints today  Good # [phone]

## 2019-01-11 ENCOUNTER — HOSPITAL ENCOUNTER (INPATIENT)
Facility: MEDICAL CENTER | Age: 24
LOS: 1 days | End: 2019-01-12
Attending: OBSTETRICS & GYNECOLOGY | Admitting: OBSTETRICS & GYNECOLOGY
Payer: MEDICAID

## 2019-01-11 LAB
ALBUMIN SERPL BCP-MCNC: 4.3 G/DL (ref 3.2–4.9)
ALBUMIN/GLOB SERPL: 0.9 G/DL
ALP SERPL-CCNC: 280 U/L (ref 30–99)
ALT SERPL-CCNC: 14 U/L (ref 2–50)
ANION GAP SERPL CALC-SCNC: 14 MMOL/L (ref 0–11.9)
AST SERPL-CCNC: 20 U/L (ref 12–45)
BASOPHILS # BLD AUTO: 0.3 % (ref 0–1.8)
BASOPHILS # BLD: 0.04 K/UL (ref 0–0.12)
BILIRUB SERPL-MCNC: 0.5 MG/DL (ref 0.1–1.5)
BUN SERPL-MCNC: 9 MG/DL (ref 8–22)
CALCIUM SERPL-MCNC: 9.7 MG/DL (ref 8.5–10.5)
CHLORIDE SERPL-SCNC: 105 MMOL/L (ref 96–112)
CO2 SERPL-SCNC: 17 MMOL/L (ref 20–33)
CREAT SERPL-MCNC: 0.63 MG/DL (ref 0.5–1.4)
EOSINOPHIL # BLD AUTO: 0.02 K/UL (ref 0–0.51)
EOSINOPHIL NFR BLD: 0.2 % (ref 0–6.9)
ERYTHROCYTE [DISTWIDTH] IN BLOOD BY AUTOMATED COUNT: 36.6 FL (ref 35.9–50)
ERYTHROCYTE [DISTWIDTH] IN BLOOD BY AUTOMATED COUNT: 36.7 FL (ref 35.9–50)
GLOBULIN SER CALC-MCNC: 4.6 G/DL (ref 1.9–3.5)
GLUCOSE SERPL-MCNC: 76 MG/DL (ref 65–99)
HCT VFR BLD AUTO: 33.2 % (ref 37–47)
HCT VFR BLD AUTO: 43.7 % (ref 37–47)
HGB BLD-MCNC: 10.9 G/DL (ref 12–16)
HGB BLD-MCNC: 14.2 G/DL (ref 12–16)
HOLDING TUBE BB 8507: NORMAL
IMM GRANULOCYTES # BLD AUTO: 0.06 K/UL (ref 0–0.11)
IMM GRANULOCYTES NFR BLD AUTO: 0.5 % (ref 0–0.9)
LYMPHOCYTES # BLD AUTO: 2.11 K/UL (ref 1–4.8)
LYMPHOCYTES NFR BLD: 16.3 % (ref 22–41)
MCH RBC QN AUTO: 23.3 PG (ref 27–33)
MCH RBC QN AUTO: 23.5 PG (ref 27–33)
MCHC RBC AUTO-ENTMCNC: 32.5 G/DL (ref 33.6–35)
MCHC RBC AUTO-ENTMCNC: 32.8 G/DL (ref 33.6–35)
MCV RBC AUTO: 71.7 FL (ref 81.4–97.8)
MCV RBC AUTO: 71.8 FL (ref 81.4–97.8)
MONOCYTES # BLD AUTO: 0.47 K/UL (ref 0–0.85)
MONOCYTES NFR BLD AUTO: 3.6 % (ref 0–13.4)
NEUTROPHILS # BLD AUTO: 10.27 K/UL (ref 2–7.15)
NEUTROPHILS NFR BLD: 79.1 % (ref 44–72)
NRBC # BLD AUTO: 0 K/UL
NRBC BLD-RTO: 0 /100 WBC
PLATELET # BLD AUTO: 237 K/UL (ref 164–446)
PLATELET # BLD AUTO: 315 K/UL (ref 164–446)
PMV BLD AUTO: 10.1 FL (ref 9–12.9)
PMV BLD AUTO: 9.8 FL (ref 9–12.9)
POTASSIUM SERPL-SCNC: 3.5 MMOL/L (ref 3.6–5.5)
PROT SERPL-MCNC: 8.9 G/DL (ref 6–8.2)
RBC # BLD AUTO: 4.63 M/UL (ref 4.2–5.4)
RBC # BLD AUTO: 6.09 M/UL (ref 4.2–5.4)
SODIUM SERPL-SCNC: 136 MMOL/L (ref 135–145)
URATE SERPL-MCNC: 4.6 MG/DL (ref 1.9–8.2)
WBC # BLD AUTO: 13 K/UL (ref 4.8–10.8)
WBC # BLD AUTO: 14.1 K/UL (ref 4.8–10.8)

## 2019-01-11 PROCEDURE — 36415 COLL VENOUS BLD VENIPUNCTURE: CPT

## 2019-01-11 PROCEDURE — 770002 HCHG ROOM/CARE - OB PRIVATE (112)

## 2019-01-11 PROCEDURE — 304965 HCHG RECOVERY SERVICES

## 2019-01-11 PROCEDURE — 80053 COMPREHEN METABOLIC PANEL: CPT

## 2019-01-11 PROCEDURE — 85025 COMPLETE CBC W/AUTO DIFF WBC: CPT

## 2019-01-11 PROCEDURE — 700111 HCHG RX REV CODE 636 W/ 250 OVERRIDE (IP)

## 2019-01-11 PROCEDURE — 0HQ9XZZ REPAIR PERINEUM SKIN, EXTERNAL APPROACH: ICD-10-PCS | Performed by: OBSTETRICS & GYNECOLOGY

## 2019-01-11 PROCEDURE — 700111 HCHG RX REV CODE 636 W/ 250 OVERRIDE (IP): Performed by: OBSTETRICS & GYNECOLOGY

## 2019-01-11 PROCEDURE — 59409 OBSTETRICAL CARE: CPT

## 2019-01-11 PROCEDURE — 85027 COMPLETE CBC AUTOMATED: CPT

## 2019-01-11 PROCEDURE — 84550 ASSAY OF BLOOD/URIC ACID: CPT

## 2019-01-11 RX ORDER — DOCUSATE SODIUM 100 MG/1
100 CAPSULE, LIQUID FILLED ORAL 2 TIMES DAILY PRN
Status: DISCONTINUED | OUTPATIENT
Start: 2019-01-11 | End: 2019-01-12 | Stop reason: HOSPADM

## 2019-01-11 RX ORDER — ACETAMINOPHEN 325 MG/1
325 TABLET ORAL EVERY 4 HOURS PRN
Status: DISCONTINUED | OUTPATIENT
Start: 2019-01-11 | End: 2019-01-12 | Stop reason: HOSPADM

## 2019-01-11 RX ORDER — ONDANSETRON 4 MG/1
4 TABLET, ORALLY DISINTEGRATING ORAL EVERY 6 HOURS PRN
Status: DISCONTINUED | OUTPATIENT
Start: 2019-01-11 | End: 2019-01-12 | Stop reason: HOSPADM

## 2019-01-11 RX ORDER — SODIUM CHLORIDE, SODIUM LACTATE, POTASSIUM CHLORIDE, CALCIUM CHLORIDE 600; 310; 30; 20 MG/100ML; MG/100ML; MG/100ML; MG/100ML
INJECTION, SOLUTION INTRAVENOUS PRN
Status: DISCONTINUED | OUTPATIENT
Start: 2019-01-11 | End: 2019-01-12 | Stop reason: HOSPADM

## 2019-01-11 RX ORDER — METHYLERGONOVINE MALEATE 0.2 MG/ML
0.2 INJECTION INTRAVENOUS
Status: DISCONTINUED | OUTPATIENT
Start: 2019-01-11 | End: 2019-01-11 | Stop reason: HOSPADM

## 2019-01-11 RX ORDER — HYDROCODONE BITARTRATE AND ACETAMINOPHEN 5; 325 MG/1; MG/1
1 TABLET ORAL EVERY 4 HOURS PRN
Status: DISCONTINUED | OUTPATIENT
Start: 2019-01-11 | End: 2019-01-12 | Stop reason: HOSPADM

## 2019-01-11 RX ORDER — MISOPROSTOL 200 UG/1
800 TABLET ORAL
Status: DISCONTINUED | OUTPATIENT
Start: 2019-01-11 | End: 2019-01-11 | Stop reason: HOSPADM

## 2019-01-11 RX ORDER — SODIUM CHLORIDE, SODIUM LACTATE, POTASSIUM CHLORIDE, CALCIUM CHLORIDE 600; 310; 30; 20 MG/100ML; MG/100ML; MG/100ML; MG/100ML
INJECTION, SOLUTION INTRAVENOUS CONTINUOUS
Status: ACTIVE | OUTPATIENT
Start: 2019-01-11 | End: 2019-01-11

## 2019-01-11 RX ORDER — VITAMIN A ACETATE, BETA CAROTENE, ASCORBIC ACID, CHOLECALCIFEROL, .ALPHA.-TOCOPHEROL ACETATE, DL-, THIAMINE MONONITRATE, RIBOFLAVIN, NIACINAMIDE, PYRIDOXINE HYDROCHLORIDE, FOLIC ACID, CYANOCOBALAMIN, CALCIUM CARBONATE, FERROUS FUMARATE, ZINC OXIDE, CUPRIC OXIDE 3080; 12; 120; 400; 1; 1.84; 3; 20; 22; 920; 25; 200; 27; 10; 2 [IU]/1; UG/1; MG/1; [IU]/1; MG/1; MG/1; MG/1; MG/1; MG/1; [IU]/1; MG/1; MG/1; MG/1; MG/1; MG/1
1 TABLET, FILM COATED ORAL EVERY MORNING
Status: DISCONTINUED | OUTPATIENT
Start: 2019-01-11 | End: 2019-01-12 | Stop reason: HOSPADM

## 2019-01-11 RX ORDER — IBUPROFEN 600 MG/1
600 TABLET ORAL EVERY 6 HOURS PRN
Status: DISCONTINUED | OUTPATIENT
Start: 2019-01-11 | End: 2019-01-12 | Stop reason: HOSPADM

## 2019-01-11 RX ORDER — SODIUM CHLORIDE, SODIUM LACTATE, POTASSIUM CHLORIDE, CALCIUM CHLORIDE 600; 310; 30; 20 MG/100ML; MG/100ML; MG/100ML; MG/100ML
INJECTION, SOLUTION INTRAVENOUS
Status: ACTIVE
Start: 2019-01-11 | End: 2019-01-12

## 2019-01-11 RX ORDER — CARBOPROST TROMETHAMINE 250 UG/ML
250 INJECTION, SOLUTION INTRAMUSCULAR
Status: DISCONTINUED | OUTPATIENT
Start: 2019-01-11 | End: 2019-01-11 | Stop reason: HOSPADM

## 2019-01-11 RX ORDER — HYDROCODONE BITARTRATE AND ACETAMINOPHEN 10; 325 MG/1; MG/1
1 TABLET ORAL EVERY 4 HOURS PRN
Status: DISCONTINUED | OUTPATIENT
Start: 2019-01-11 | End: 2019-01-12 | Stop reason: HOSPADM

## 2019-01-11 RX ORDER — ONDANSETRON 2 MG/ML
4 INJECTION INTRAMUSCULAR; INTRAVENOUS EVERY 6 HOURS PRN
Status: DISCONTINUED | OUTPATIENT
Start: 2019-01-11 | End: 2019-01-12 | Stop reason: HOSPADM

## 2019-01-11 RX ADMIN — FENTANYL CITRATE 100 MCG: 50 INJECTION, SOLUTION INTRAMUSCULAR; INTRAVENOUS at 14:57

## 2019-01-11 RX ADMIN — FENTANYL CITRATE 100 MCG: 50 INJECTION, SOLUTION INTRAMUSCULAR; INTRAVENOUS at 13:54

## 2019-01-11 RX ADMIN — Medication 2000 ML/HR: at 15:10

## 2019-01-11 RX ADMIN — Medication 125 ML/HR: at 16:14

## 2019-01-11 ASSESSMENT — PATIENT HEALTH QUESTIONNAIRE - PHQ9
1. LITTLE INTEREST OR PLEASURE IN DOING THINGS: NOT AT ALL
2. FEELING DOWN, DEPRESSED, IRRITABLE, OR HOPELESS: NOT AT ALL
SUM OF ALL RESPONSES TO PHQ9 QUESTIONS 1 AND 2: 0

## 2019-01-11 ASSESSMENT — PAIN SCALES - GENERAL
PAINLEVEL_OUTOF10: 1
PAINLEVEL_OUTOF10: 2
PAINLEVEL_OUTOF10: 0

## 2019-01-11 NOTE — H&P
History and Physical      Araceli Vale is a 24 y.o. year old female  at 39w2d dated by LMP consistent with 19 week US who presents in labor.     PNC with the [facility] starting at 19 weeks.     Subjective:   positive  For CTXS.   positive Feels pain   negative for LOF  negative for vaginal bleeding.   positive for fetal movement    ROS: Patient denies fever, chills, nausea, vomiting , headache, visual disturbance, swelling of hands/face and dysuria.    History reviewed. No pertinent past medical history.  Past Surgical History:   Procedure Laterality Date   • DILATION AND CURETTAGE  3/17/2017    Procedure: DILATION AND CURETTAGE;  Surgeon: Vic Soler M.D.;  Location: LABOR AND DELIVERY;  Service:      OB History    Para Term  AB Living   4 2 1 1 1 1   SAB TAB Ectopic Molar Multiple Live Births   1     0   2      # Outcome Date GA Lbr Geovanny/2nd Weight Sex Delivery Anes PTL Lv   4  17 22w0d  0.385 kg (13.6 oz) F    ND      Birth Comments: D&C needed. partial molar pregnancy   3 Term 12/05/15 40w0d  2.3 kg (5 lb 1.1 oz) F Vag-Spont None N KELLY      Birth Comments: Denies any complications   2 SAB 2014 8w0d             Birth Comments: passed on its own   1                  Social History     Social History   • Marital status: Single     Spouse name: N/A   • Number of children: N/A   • Years of education: N/A     Occupational History   • Not on file.     Social History Main Topics   • Smoking status: Never Smoker   • Smokeless tobacco: Never Used   • Alcohol use No   • Drug use: No   • Sexual activity: Yes     Partners: Male     Birth control/ protection: Implant, Injection      Comment: Considering depo or Nexplanon     Other Topics Concern   • Not on file     Social History Narrative   • No narrative on file     Allergies: Patient has no known allergies.  No current facility-administered medications on file prior to encounter.      Current Outpatient Prescriptions on File Prior to  "Encounter   Medication Sig Dispense Refill   • Prenatal Multivit-Min-Fe-FA (PRENATAL VITAMINS PO) Take  by mouth.           Objective:      Blood pressure 140/90, pulse 100, height 1.626 m (5' 4\"), weight 81.6 kg (180 lb), last menstrual period 2018, unknown if currently breastfeeding.    General: No acute distress, resting comfortably in bed.  HEENT: normocephalic, nontraumatic, EOMI  Cardiovascular: Heart RRR with no murmurs, rubs or gallops. Distal Pulses 2+  Respiratory: symmetric chest expansion, lungs CTAB, with no wheezes, rales, rhonci  Abdomen: gravid, nontender  Musculoskeletal: strength 5/5 in four extremities  Neuro: non focal with no numbness, tingling or changes in sensation  [place]: Uterine Contractions Q3-5 minutes.   FHRM: Baseline 120, Accels, no decels, moderate variability  Presentation: vertex  Cervix:  5cm/80%/-2- intact       Lab Review  Lab:   Blood type: A    HBsAg: non-reactive   HIV: non-reactive   GC: negative   Chlamydia: negative    Rubella: immune    GBS: negative  1 hr GTT:98    Recent Labs     07/02/18 07/06/18  10/14/18   1445 10/15/18   ABOGROUP   --   A   --    --    RUBELLAIGG   --   6.77 IMMUNE   --    --    RPR   --   NO REACTIVE   --   Not detected    URINECULT   --    --   Mixed skin jada <10,000 cfu/mL   --    HEPBSAG   --   NON REACTIVE   --    --    GCBYDNAPR  Negative   --    --    --        No results for input(s): WBC, RBC, HEMOGLOBIN, HEMATOCRIT, MCV, MCH, RDW, PLATELETCT, MPV, NEUTSPOLYS, LYMPHOCYTES, MONOCYTES, EOSINOPHILS, BASOPHILS, RBCMORPHOLO in the last 72 hours.  No results for input(s): SODIUM, POTASSIUM, CHLORIDE, CO2, GLUCOSE, BUN, CPKTOTAL in the last 72 hours.        Assessment/Plan:   Araceli Vale is a 24 y.o. year old female  at 39w2d dated by LMP consistent with 19 week US who presents in labor.     #Pregnancy  - Admit to L&D  - Anticipate   - GBS negative, PNL wnl  - BP: 140/90 in triage- CMP, Uric Acid, CBC pending   - No HTN " throughout pregnancy- CTM   - Fentanyl for pain control- no epidural  - Will AROM when FOB arrives     #Postpartum  - Pt desires either nexplanon or Depo

## 2019-01-11 NOTE — PROGRESS NOTES
S: Patient uncomfortable with contractions.    O:  Vitals:    19 1248   BP: 140/90   Pulse: 100     [place] - q 3-4 min  EFM - 120s, moderate variability, + accels  Cx -- 7 cm/80%/-2    AROM with bloody fluid    A/P: 23 yo  @ 39+2/7 wks admitted for active labor.  Fetal status reassuring.  Anticipate .

## 2019-01-11 NOTE — PROGRESS NOTES
1240-pt presents from home with c/o strong uc's today, no c/o leaking, bleeding or other pain, states baby is moving normally, placed on external monitors, vs taken and set for q 10 min due to elevation, SVE 5/80/-2  1250-TC Dr Dailey, report given, admission order received  1300-report given to JONEL Hall RN, POC discussed, transferred to D

## 2019-01-11 NOTE — PROGRESS NOTES
1300: Report received from ELIDA Wesley RN. Plan of care discussed. Patient ambulates from LDA 3 to S218 at this time. Mother and child at bedside. Patient heavily breathing through contractions at this time. Admission orders placed per MD.  1310: Resident MD at bedside to talk with patient.  1412: Resident MD at bedside to rupture patient. Rupture unsuccessful.   1456: Dr. Lara at bedside to rupture patient. AROM. SVE per MD 7/100/-2.  1505: RN checks patient at this time. C/+2  1506: RN calls MD and pickup RN to bedside.  1507: Delivery of a viable female infant. Reduced nuchal x1. APGARS 8/9.  1512: Delivery of intact placenta. 3 VC.  1545: Patient up to void at this time. Stable on feet.  1555: Report given to PP RN. Plan of care discussed.

## 2019-01-12 VITALS
HEIGHT: 64 IN | DIASTOLIC BLOOD PRESSURE: 81 MMHG | BODY MASS INDEX: 30.73 KG/M2 | WEIGHT: 180 LBS | TEMPERATURE: 97.7 F | HEART RATE: 85 BPM | OXYGEN SATURATION: 97 % | SYSTOLIC BLOOD PRESSURE: 123 MMHG | RESPIRATION RATE: 18 BRPM

## 2019-01-12 PROBLEM — O23.41 UTI IN PREGNANCY, ANTEPARTUM, FIRST TRIMESTER: Status: RESOLVED | Noted: 2018-07-02 | Resolved: 2019-01-12

## 2019-01-12 PROBLEM — O09.893 SUPERVISION OF OTHER HIGH RISK PREGNANCIES, THIRD TRIMESTER: Status: RESOLVED | Noted: 2018-07-02 | Resolved: 2019-01-12

## 2019-01-12 PROCEDURE — 700102 HCHG RX REV CODE 250 W/ 637 OVERRIDE(OP): Performed by: STUDENT IN AN ORGANIZED HEALTH CARE EDUCATION/TRAINING PROGRAM

## 2019-01-12 PROCEDURE — A9270 NON-COVERED ITEM OR SERVICE: HCPCS | Performed by: STUDENT IN AN ORGANIZED HEALTH CARE EDUCATION/TRAINING PROGRAM

## 2019-01-12 PROCEDURE — 700102 HCHG RX REV CODE 250 W/ 637 OVERRIDE(OP): Performed by: OBSTETRICS & GYNECOLOGY

## 2019-01-12 PROCEDURE — A9270 NON-COVERED ITEM OR SERVICE: HCPCS | Performed by: OBSTETRICS & GYNECOLOGY

## 2019-01-12 RX ORDER — PSEUDOEPHEDRINE HCL 30 MG
100 TABLET ORAL 2 TIMES DAILY PRN
Qty: 60 CAP | Refills: 1 | Status: SHIPPED | OUTPATIENT
Start: 2019-01-12 | End: 2021-01-25

## 2019-01-12 RX ADMIN — IBUPROFEN 600 MG: 600 TABLET, FILM COATED ORAL at 08:30

## 2019-01-12 RX ADMIN — Medication 1 TABLET: at 08:30

## 2019-01-12 ASSESSMENT — EDINBURGH POSTNATAL DEPRESSION SCALE (EPDS)
THINGS HAVE BEEN GETTING ON TOP OF ME: NO, I HAVE BEEN COPING AS WELL AS EVER
I HAVE FELT SCARED OR PANICKY FOR NO GOOD REASON: NO, NOT AT ALL
I HAVE BEEN SO UNHAPPY THAT I HAVE BEEN CRYING: NO, NEVER
THE THOUGHT OF HARMING MYSELF HAS OCCURRED TO ME: NEVER
I HAVE BEEN ANXIOUS OR WORRIED FOR NO GOOD REASON: NO, NOT AT ALL
I HAVE LOOKED FORWARD WITH ENJOYMENT TO THINGS: AS MUCH AS I EVER DID
I HAVE BEEN SO UNHAPPY THAT I HAVE BEEN CRYING: NO, NEVER
I HAVE BEEN SO UNHAPPY THAT I HAVE HAD DIFFICULTY SLEEPING: NOT AT ALL
I HAVE FELT SCARED OR PANICKY FOR NO GOOD REASON: NO, NOT AT ALL
I HAVE BEEN ABLE TO LAUGH AND SEE THE FUNNY SIDE OF THINGS: AS MUCH AS I ALWAYS COULD
I HAVE BLAMED MYSELF UNNECESSARILY WHEN THINGS WENT WRONG: NO, NEVER
I HAVE LOOKED FORWARD WITH ENJOYMENT TO THINGS: AS MUCH AS I EVER DID
I HAVE BLAMED MYSELF UNNECESSARILY WHEN THINGS WENT WRONG: NO, NEVER
THE THOUGHT OF HARMING MYSELF HAS OCCURRED TO ME: NEVER
I HAVE FELT SAD OR MISERABLE: NO, NOT AT ALL
THINGS HAVE BEEN GETTING ON TOP OF ME: NO, I HAVE BEEN COPING AS WELL AS EVER
I HAVE BEEN ANXIOUS OR WORRIED FOR NO GOOD REASON: NO, NOT AT ALL
I HAVE BEEN SO UNHAPPY THAT I HAVE HAD DIFFICULTY SLEEPING: NOT AT ALL
I HAVE FELT SAD OR MISERABLE: NO, NOT AT ALL
I HAVE BEEN ABLE TO LAUGH AND SEE THE FUNNY SIDE OF THINGS: AS MUCH AS I ALWAYS COULD

## 2019-01-12 ASSESSMENT — PAIN SCALES - GENERAL
PAINLEVEL_OUTOF10: 0
PAINLEVEL_OUTOF10: 0
PAINLEVEL_OUTOF10: 4

## 2019-01-12 NOTE — CONSULTS
Lactation note:  Initial visit.  Discussed normal  behaviors and normal course of breastfeeding at 12-24-48-72 hours, and what to expect. Discussed importance of offering breast every 2-3 hours, or more often with feeding cues.Encouraged to do hand expression into infant's lips and to continue doing skin to skin. Discussed signs of a good latch, voiding and stooling patterns, feeding cues, stomach size, and importance of establishing milk supply with frequency of feedings. Getting to know your baby pamphlet given, and breastfeeding content reviewed.   Plan for tonight is to continue to offer breast first, if not latching well, can hand express colostrum, and refeed to baby.  Observed MOB attempting to latch infant on the right side with cradle hold. Infant not opening wide enough, and not interested at breast. Showed MOB how to hand express, and colostrum easily expressed. MOB states her right nipple is sore. Discussed applying colostrum on sore nipple, and letting air dry, for nipple care.  Encouraged her to continue to work on deep latch, and discussed risks of shallow latch.       [facility] on [address]. Encouraged to follow up with [facility] for additional lactation support after discharge.   Information and phone number given for the Lactation connection, and invited to breastfeeding circles.    Encouraged to call for support as needed.

## 2019-01-12 NOTE — CARE PLAN
Problem: Altered physiologic condition related to immediate post-delivery state and potential for bleeding/hemorrhage  Goal: Patient physiologically stable as evidenced by normal lochia, palpable uterine involution and vital signs within normal limits  Outcome: PROGRESSING AS EXPECTED  Fundus firm at umbilicus with light lochia.    Problem: Alteration in comfort related to episiotomy, vaginal repair and/or after birth pains  Goal: Patient is able to ambulate, care for self and infant  Outcome: PROGRESSING AS EXPECTED  Ambulating  and voiding without difficulty.  Goal: Patient verbalizes acceptable pain level  Outcome: PROGRESSING AS EXPECTED  Verbalizes acceptable pain relief with warm pack being given as requested. Patient declines pain medication.    Problem: Potential knowledge deficit related to lack of understanding of self and  care  Goal: Patient will demonstrate ability to care for self and infant  Outcome: PROGRESSING AS EXPECTED  Patient continues to attempt breast feeding infant. Infant latched once on days. Instructed patient to keep attempting breast feeding every 2-3 hours while doing skin to skin.

## 2019-01-12 NOTE — DISCHARGE SUMMARY
Discharge Summary:      Araceli Vale      Admit Date:   2019  Discharge Date:  2019     Admitting diagnosis:  Pregnancy  Indication for care in labor or delivery  Indication for care in labor or delivery  Discharge Diagnosis: Status post   Pregnancy Complications: non  Tubal Ligation:  none        History:  History reviewed. No pertinent past medical history.  OB History    Para Term  AB Living   4 3 2 1 1 2   SAB TAB Ectopic Molar Multiple Live Births   1     0   3      # Outcome Date GA Lbr Geovanny/2nd Weight Sex Delivery Anes PTL Lv   4 Term 19 39w2d  2.59 kg (5 lb 11.4 oz) F Vag-Spont OTHER N KELLY   3  17 22w0d  0.385 kg (13.6 oz) F    ND      Birth Comments: D&C needed. partial molar pregnancy   2 Term 12/05/15 40w0d  2.3 kg (5 lb 1.1 oz) F Vag-Spont None N KELLY      Birth Comments: Denies any complications   1 SAB 2014 8w0d             Birth Comments: passed on its own           Patient has no known allergies.  Patient Active Problem List    Diagnosis Date Noted   • History of molar pregnancy 2018   • Supervision of other high risk pregnancies, third trimester 2018   • UTI in pregnancy, antepartum, first trimester 2018        Hospital Course:   24 y.o. now , was admitted with the above mentioned diagnosis, underwent . Patient postpartum course was unremarkable, with progressive advancement in diet, ambulation and toleration of oral analgesia. Patient without complaints today and desires discharge.      Abdominal pain: min  Ambulating: y  tolerating liquids: y  tolerating regular diet: y  Flatus: y  BM: n  Bleeding: y  Voiding: y  Dizziness: n  Breast feeding: y  Breast tenderness: n    Vitals:    19 1617 19 1657 19 2000 19 0000   BP: 117/68 121/73 120/73 137/83   Pulse: 78 81 93 75   Resp:  18 17 16   Temp:  36.7 °C (98.1 °F) 37.1 °C (98.8 °F) 36.7 °C (98.1 °F)   TempSrc:  Temporal Temporal Temporal   SpO2:   95%  97%   Weight:       Height:           Current Facility-Administered Medications   Medication Dose   • ondansetron (ZOFRAN ODT) dispertab 4 mg  4 mg    Or   • ondansetron (ZOFRAN) syringe/vial injection 4 mg  4 mg   • oxytocin (PITOCIN) infusion (for postpartum)   mL/hr   • ibuprofen (MOTRIN) tablet 600 mg  600 mg   • acetaminophen (TYLENOL) tablet 325 mg  325 mg   • HYDROcodone-acetaminophen (NORCO) 5-325 MG per tablet 1 Tab  1 Tab   • HYDROcodone/acetaminophen (NORCO)  MG per tablet 1 Tab  1 Tab   • LR infusion     • docusate sodium (COLACE) capsule 100 mg  100 mg   • prenatal plus vitamin (STUARTNATAL 1+1) 27-1 MG tablet 1 Tab  1 Tab       Exam:  Vitals:    01/11/19 1617 01/11/19 1657 01/11/19 2000 01/12/19 0000   BP: 117/68 121/73 120/73 137/83   Pulse: 78 81 93 75   Resp:  18 17 16   Temp:  36.7 °C (98.1 °F) 37.1 °C (98.8 °F) 36.7 °C (98.1 °F)   TempSrc:  Temporal Temporal Temporal   SpO2:   95% 97%   Weight:       Height:         General: No acute distress, resting comfortably in bed.  HEENT: normocephalic, nontraumatic, EOMI  Cardiovascular: Heart RRR with no murmurs, rubs or gallops. Distal Pulses 2+  Respiratory: symmetric chest expansion, lungs CTA bilaterally with no wheezes rales or rhonci  Abdomen: soft, fundus firm, +BS  Genitourinary: lochia light, denies excessive vaginal bleeding  Musculoskeletal: strength 5/5 in four extremities, no calf tenderness  Neuro: no focal deficits noted       Labs:  Recent Labs      01/11/19   1315  01/11/19   2307   WBC  13.0*  14.1*   RBC  6.09*  4.63   HEMOGLOBIN  14.2  10.9*   HEMATOCRIT  43.7  33.2*   MCV  71.8*  71.7*   MCH  23.3*  23.5*   MCHC  32.5*  32.8*   RDW  36.6  36.7   PLATELETCT  315  237   MPV  10.1  9.8        Activity:   Discharge to home  Pelvic Rest x 6 weeks    Assessment:  normal postpartum course and good candidate for Nexplanon     Follow up: .  [facility] in 5 weeks for post partum follow up;  To resume daily PNV and  iron supplement if needed with hydration.     Patient to return to [facility] or ER if any of the following occur:   Fever over 100.5   Severe abdominal pain   Red streaks or painful masses in the breasts   Foul smelling discharge or lochia   Heavy vaginal bleeding saturating a pad per hour   S/s of PP depression     Discharge Meds:      Medication List      ASK your doctor about these medications      Instructions   PRENATAL VITAMINS PO   Take  by mouth.            Ugo Dailey M.D.

## 2019-01-12 NOTE — CARE PLAN
Problem: Altered physiologic condition related to immediate post-delivery state and potential for bleeding/hemorrhage  Goal: Patient physiologically stable as evidenced by normal lochia, palpable uterine involution and vital signs within normal limits  Outcome: PROGRESSING AS EXPECTED  Fundus is firm, lochia is light and vital signs are stable. Will continue to monitor with Q4 hour checks and patient rounding.    Problem: Potential for postpartum infection related to presence of episiotomy/vaginal tear and/or uterine contamination  Goal: Patient will be absent from signs and symptoms of infection  Outcome: PROGRESSING AS EXPECTED  Patient does not exhibit any signs/symptoms of infection; VSS and afebrile. Will continue to monitor with Q6 hour checks and patient rounding

## 2019-01-12 NOTE — CARE PLAN
Problem: Potential anxiety related to difficulty adapting to parental role  Goal: Patient will verbalize and demonstrate effective bonding and parenting behavior    Intervention: Assess patient for anxiety or apprehension regarding parenting role  Patient continues to breastfeed on demand and asking for similac formula. Patient ambulating at will without assistance. Patient encouraged to breastfeed first and supplement with each infant feed. Patient plans to discharge this shift once infant 24 hours old. Patient continues to use prn pain medication as needed.

## 2019-01-12 NOTE — LACTATION NOTE
"This note was copied from a baby's chart.  Mother states baby BF \"ok\" but she is supplementing with formula because she thinks she doesn't have milk, discussed normal course of BF including feeding frequency and duration, discussed expectations regarding milk supply/volumes, discussed supply and demand in regard to milk supply, assured that supplementation is not needed at this time, educated on effect of supplementation on BF and milk supply, encouraged to BF frequently Q 2-3 hours, more often if feeding cues noted) and if desires to supplement to only give small volumes of formula after BF, mother has WIC, encouraged to seek ongoing assistance with BF from WIC counselor as needed after discharge, discussed assistance available at [facility]  And invited to BF [place].    Encouraged to call for assistance as needed  "

## 2019-01-12 NOTE — PROGRESS NOTES
This RN and Nurse Apprentice, Margarette down to L&D and introduced ourselves to patient as her nurses for postpartum.

## 2019-01-12 NOTE — DISCHARGE INSTRUCTIONS
POSTPARTUM DISCHARGE INSTRUCTIONS FOR MOM    YOB: 1995   Age: 24 y.o.               Admit Date: 2019     Discharge Date: 2019  Attending Doctor:  Leonor Lara M.D.                  Allergies:  Patient has no known allergies.    Discharged to home by car. Discharged via walking, hospital escort: Refused.  Special equipment needed: Not Applicable  Belongings with: Personal  Be sure to schedule a follow-up appointment with your primary care doctor or any specialists as instructed.     Discharge Plan:   Diet Plan: Discussed  Activity Level: Discussed  Confirmed Follow up Appointment: Patient to Call and Schedule Appointment  Confirmed Symptoms Management: Discussed  Medication Reconciliation Updated: Yes    REASONS TO CALL YOUR OBSTETRICIAN:  1.   Persistent fever or shaking chills (Temperature higher than 100.4)  2.   Heavy bleeding (soaking more than 1 pad per hour); Passing clots  3.   Foul odor from vagina  4.   Mastitis (Breast infection; breast pain, chills, fever, redness)  5.   Urinary pain, burning or frequency  6.   Episiotomy infection  7.   Abdominal incision infection  8.   Severe depression longer than 24 hours    HAND WASHING  · Prior to handling the baby.  · Before breastfeeding or bottle feeding baby.  · After using the bathroom or changing the baby's diaper.    WOUND CARE  Ask your physician for additional care instructions.  In general:    ·  Incision:      · Keep clean and dry.    · Do NOT lift anything heavier than your baby for up to 6 weeks.    · There should not be any opening or pus.      VAGINAL CARE  · Nothing inside vagina for 6 weeks: no sexual intercourse, tampons or douching.  · Bleeding may continue for 2-4 weeks.  Amount may vary.    · Call your physician for heavy bleeding which means soaking more than 1 pad per hour    BIRTH CONTROL  · It is possible to become pregnant at any time after delivery and while breastfeeding.  · Plan to discuss a method of  "birth control with your physician at your follow up visit. visit.    DIET AND ELIMINATION  · Eating more fiber (bran cereal, fruits, and vegetables) and drinking plenty of fluids will help to avoid constipation.  · Urinary frequency after childbirth is normal.    POSTPARTUM BLUES  During the first few days after birth, you may experience a sense of the \"blues\" which may include impatience, irritability or even crying.  These feeling come and go quickly.  However, as many as 1 in 10 women experience emotional symptoms known as postpartum depression.    Postpartum depression:  May start as early as the second or third day after delivery or take several weeks or months to develop.  Symptoms of \"blues\" are present, but are more intense:  Crying spells; loss of appetite; feelings of hopelessness or loss of control; fear of touching the baby; over concern or no concern at all about the baby; little or no concern about your own appearance/caring for yourself; and/or inability to sleep or excessive sleeping.  Contact your physician if you are experiencing any of these symptoms.    Crisis Hotline:  · [place] Crisis Hotline:  7-127-CHKWDYS  Or 1-689.485.9963  · [place] Crisis Hotline:  1-653.397.5156  Or 018-461-8153    PREVENTING SHAKEN BABY:  If you are angry or stressed, PUT THE BABY IN THE CRIB, step away, take some deep breaths, and wait until you are calm to care for the baby.  DO NOT SHAKE THE BABY.  You are not alone, call a supporter for help.    · Crisis Call Center 24/7 crisis line 987-482-5393 or 1-403.584.5515  · You can also text them, text \"ANSWER\" to 538631    QUIT SMOKING/TOBACCO USE:  I understand the use of any tobacco products increases my chance of suffering from future heart disease and could cause other illnesses which may shorten my life. Quitting the use of tobacco products is the single most important thing I can do to improve my health. For further information on smoking / tobacco cessation call a " Toll Free Quit Line at 1-243.656.9490 (*National [employer] [place]) or 1-850.524.4650 (American Lung Association) or you can access the web based program at www.lungusa.org.    · [place] Tobacco Users Help Line:  (202) 172-3296       Toll Free: 1-112.411.5050  · Quit Tobacco Program [facility] (342)668-2010    DEPRESSION / SUICIDE RISK:  As you are discharged from this [facility], it is important to learn how to keep safe from harming yourself.    Recognize the warning signs:  · Abrupt changes in personality, positive or negative- including increase in energy   · Giving away possessions  · Change in eating patterns- significant weight changes-  positive or negative  · Change in sleeping patterns- unable to sleep or sleeping all the time   · Unwillingness or inability to communicate  · Depression  · Unusual sadness, discouragement and loneliness  · Talk of wanting to die  · Neglect of personal appearance   · Rebelliousness- reckless behavior  · Withdrawal from people/activities they love  · Confusion- inability to concentrate     If you or a loved one observes any of these behaviors or has concerns about self-harm, here's what you can do:  · Talk about it- your feelings and reasons for harming yourself  · Remove any means that you might use to hurt yourself (examples: pills, rope, extension cords, firearm)  · Get professional help from the community (Mental Health, Substance Abuse, psychological counseling)  · Do not be alone:Call your Safe Contact- someone whom you trust who will be there for you.  · Call your local CRISIS HOTLINE 131-3751 or 531-442-9071  · Call your local Children's Mobile Crisis Response Team [place] (060) 989-5253 or www.[employer] Connect  · Call the toll free National Suicide Prevention Hotlines   · National Suicide Prevention Lifeline 217-220-JMWO (3428)  · National Hope Line Network 800-SUICIDE (302-7854)    DISCHARGE SURVEY:  Thank you for choosing  [facility].  We hope we provided you with very good care.  You may be receiving a survey in the mail.  Please fill it out.  Your opinion is valuable to us.    ADDITIONAL EDUCATIONAL MATERIALS GIVEN TO PATIENT:        My signature on this form indicates that:  1.  I have reviewed and understand the above information  2.  My questions regarding this information have been answered to my satisfaction.  3.  I have formulated a plan with my discharge nurse to obtain my prescribed medication for home.

## 2019-01-12 NOTE — PROGRESS NOTES
Patient educated when to call md for post delivery hemmorage. Patient education to feed every 2-3 hours on demand. Patient educated to call for follow up appointment in 6 weeks at Obstetrician. Patient received discharge education and denies self care questions.

## 2019-01-12 NOTE — PROGRESS NOTES
Pt arrived via wheelchair with belongings to room S312. Report received from Penny L&LOVELY RN. Pt oriented to unit routine, room, call light, bathroom emergency light & infant safety & security. Assessment done. Fundus firm, lochia light. Vital signs stable. IV infusing 125 of pit in L FA. Pt denies pain at this time. Pt aware of dangers related to sleeping with infant, reviewed plan of care with pt & encouraged to call with needs or prior to ambulating. Call light in place. Will continue to monitor

## 2019-02-25 ENCOUNTER — POST PARTUM (OUTPATIENT)
Dept: OBGYN | Facility: CLINIC | Age: 24
End: 2019-02-25

## 2019-02-25 VITALS — SYSTOLIC BLOOD PRESSURE: 120 MMHG | BODY MASS INDEX: 28.32 KG/M2 | WEIGHT: 165 LBS | DIASTOLIC BLOOD PRESSURE: 68 MMHG

## 2019-02-25 PROCEDURE — 0503F POSTPARTUM CARE VISIT: CPT | Performed by: NURSE PRACTITIONER

## 2019-02-25 ASSESSMENT — ENCOUNTER SYMPTOMS
NEUROLOGICAL NEGATIVE: 1
PSYCHIATRIC NEGATIVE: 1
GASTROINTESTINAL NEGATIVE: 1
RESPIRATORY NEGATIVE: 1
MUSCULOSKELETAL NEGATIVE: 1
EYES NEGATIVE: 1
CONSTITUTIONAL NEGATIVE: 1
CARDIOVASCULAR NEGATIVE: 1

## 2019-02-25 NOTE — PROGRESS NOTES
Subjective:    Araceli Vale is a 24 y.o. female who presents for her postpartum exam 6 weeks following . Her prenatal course was uncomplicated. She denies dysuria, vaginal bleeding, odor, itching or breast problems. She is breastfeeding. She desires an Nexplanon for her birth control method. Reports no sex prior to this appointment. Eating a regular diet without difficulty. Bowel movement are Normal.  The patient is not having any pain. Started her period last week, had previously stopped for 2 weeks. Patient Denies Incisional pain, drainage or redness. Patient denies any s/sx of postpartum depression. Screening score is 0.    Problem List     Patient Active Problem List    Diagnosis Date Noted   • History of molar pregnancy 2018       Objective    See PE  Lab: H&H at d/c: 10.9/33.2  /68   Wt 74.8 kg (165 lb)   LMP 2018   BMI 28.32 kg/m²     Assessment:    1. PP care of lactating women   2. Exam WNL   3. Pap WNL on 2017  4. Desires contraception- Nexplanon       Plan:    1. Breastfeeding support   2. Continue PNV   3. Contraceptive counseling - follow up w health dept,  [facility], or [facility] for contraceptive changes, future pregnancy or other women's health care needs  4. Encouraged condom use for STI and pregnancy protection  5. Discussed diet, exercise and resumption of sexual activity   6. Preconception guidance for next pregnancy if applicable. Discussed pregnancy spacing risk factors. Folic acid for all women of childbearing age.     HPI    Review of Systems   Constitutional: Negative.    HENT: Negative.    Eyes: Negative.    Respiratory: Negative.    Cardiovascular: Negative.    Gastrointestinal: Negative.    Genitourinary: Negative.    Musculoskeletal: Negative.    Skin: Negative.    Neurological: Negative.    Endo/Heme/Allergies: Negative.    Psychiatric/Behavioral: Negative.    All other systems reviewed and are negative.         Objective:     /68   Wt 74.8 kg (165  lb)   LMP 2018   BMI 28.32 kg/m²      Physical Exam   Constitutional: She is oriented to person, place, and time. She appears well-developed and well-nourished.   HENT:   Nose: Nose normal.   Eyes: Conjunctivae are normal.   Neck: Normal range of motion.   Cardiovascular: Normal rate, regular rhythm and normal heart sounds.    Pulmonary/Chest: Effort normal and breath sounds normal.   Abdominal: Soft. Bowel sounds are normal.   Genitourinary: Vagina normal and uterus normal.   Musculoskeletal: Normal range of motion.   Neurological: She is alert and oriented to person, place, and time.   Skin: Skin is warm and dry.   Psychiatric: She has a normal mood and affect. Her behavior is normal. Judgment and thought content normal.   Nursing note and vitals reviewed.       Assessment/Plan:     1. Postpartum care and examination of lactating mother   19

## 2019-04-08 ENCOUNTER — TELEPHONE (OUTPATIENT)
Dept: OBGYN | Facility: CLINIC | Age: 24
End: 2019-04-08

## 2019-04-08 NOTE — TELEPHONE ENCOUNTER
Pt called requesting to re schedule appt and to notify that her insurance is been update, has current w/[facility] medicaid.

## 2019-04-11 ENCOUNTER — GYNECOLOGY VISIT (OUTPATIENT)
Dept: OBGYN | Facility: CLINIC | Age: 24
End: 2019-04-11
Payer: MEDICAID

## 2019-04-11 VITALS — BODY MASS INDEX: 28.32 KG/M2 | DIASTOLIC BLOOD PRESSURE: 62 MMHG | WEIGHT: 165 LBS | SYSTOLIC BLOOD PRESSURE: 100 MMHG

## 2019-04-11 DIAGNOSIS — Z32.02 NEGATIVE PREGNANCY TEST: ICD-10-CM

## 2019-04-11 DIAGNOSIS — Z30.017 NEXPLANON INSERTION: Primary | ICD-10-CM

## 2019-04-11 LAB
INT CON NEG: NEGATIVE
INT CON POS: POSITIVE
POC URINE PREGNANCY TEST: NEGATIVE

## 2019-04-11 PROCEDURE — 11981 INSERTION DRUG DLVR IMPLANT: CPT | Performed by: NURSE PRACTITIONER

## 2019-04-11 PROCEDURE — 81025 URINE PREGNANCY TEST: CPT | Performed by: NURSE PRACTITIONER

## 2019-04-11 ASSESSMENT — ENCOUNTER SYMPTOMS
RESPIRATORY NEGATIVE: 1
CONSTITUTIONAL NEGATIVE: 1
NEUROLOGICAL NEGATIVE: 1
PSYCHIATRIC NEGATIVE: 1
EYES NEGATIVE: 1
GASTROINTESTINAL NEGATIVE: 1
MUSCULOSKELETAL NEGATIVE: 1
CARDIOVASCULAR NEGATIVE: 1

## 2019-04-11 NOTE — PROGRESS NOTES
Pt here for Nexplanon insertion   # [phone]  Pt states no complaints.   Negative pregnancy test obtained in clinic today.

## 2019-04-11 NOTE — PROCEDURES
Araceli Vale is a 24 year old  who presents today for nexplanon insertion.  She is currently using condoms for birth control. UPT is negative, and LMP is 19.    All risks, benefits and potential side effects of the Nexplanon are discussed at length  Risks to include: Implant migrating, implant breaking in the arm, infection at the insertion/removal site, difficult removal of the device.  Side effects: irregular unpredictable bleeding, acne, mood changes, weight changes, headaches, dizziness  Benefits: most women (about 70%) have very little or no bleeding on the implant, decrease in cramping, strong reliable birth control at 99.6 % effective, private, totally reversible long term birth control method of 3 years       Procedures    Procedure note; Nexplanon insertion;    Informed consent obtained, consent signed-discussed the risks of Nexplanon; pain, bleeding, infection, bruising, possible pregnancy, difficulty with removing implant, possible scarring to arm.   All the risks and benefits of the procedure and the device are explained and patient verbalizes understanding and signs the consent     Urine hCG negative    Patient positioned supine with nondominant arm exposed.    Medial epicondyle of left arm palpated    Surgical tobias placed 8-10 cm medial to the medial epicondyle    Betadine prep the skin    Local anesthesia-1% lidocaine injected using a 1 1/2 inch 27 gauge needle     Implant inserted via the Nexplanon insertion device subdermally in a sterile fashion    The patient and provider can feel the implant under the skin and the blue end of the insertion device is present indicating implant insertion  Triple antibiotic ointment on sterile 4x4's     Pressure bandage placed    Patient instructed to remove dressing  in 24 hours    Patient instructed to use a band-aid for 2 days there after    Patient tolerated the procedure well    Followup in 4-6  weeks for post insertion checkup    Lot: [id number]  Exp:  09/19/2021    Sanjuanita FARNSWORTHM, APRN

## 2019-04-11 NOTE — PROGRESS NOTES
Subjective:      Araceli Vale is a 24 y.o. female who presents with Procedure (Nexplanon insertion )    HPI    Review of Systems   Constitutional: Negative.    HENT: Negative.    Eyes: Negative.    Respiratory: Negative.    Cardiovascular: Negative.    Gastrointestinal: Negative.    Genitourinary: Negative.    Musculoskeletal: Negative.    Skin: Negative.    Neurological: Negative.    Endo/Heme/Allergies: Negative.    Psychiatric/Behavioral: Negative.    All other systems reviewed and are negative.         Objective:     /62   Wt 74.8 kg (165 lb)   LMP 04/11/2019   BMI 28.32 kg/m²      Physical Exam   Constitutional: She is oriented to person, place, and time. She appears well-developed and well-nourished.   HENT:   Head: Normocephalic.   Nose: Nose normal.   Eyes: Conjunctivae are normal.   Neck: Normal range of motion.   Cardiovascular: Normal rate, regular rhythm and normal heart sounds.    Pulmonary/Chest: Effort normal and breath sounds normal.   Abdominal: Soft.   Musculoskeletal: Normal range of motion.   Neurological: She is alert and oriented to person, place, and time.   Skin: Skin is warm and dry.   Psychiatric: She has a normal mood and affect. Her behavior is normal. Judgment and thought content normal.   Nursing note and vitals reviewed.       Assessment/Plan:     1. Nexplanon insertion  Placed in left arm without incident  - POCT Pregnancy    2. Negative pregnancy test  LMP 4/11/19

## 2019-05-06 ENCOUNTER — GYNECOLOGY VISIT (OUTPATIENT)
Dept: OBGYN | Facility: CLINIC | Age: 24
End: 2019-05-06
Payer: MEDICAID

## 2019-05-06 VITALS — SYSTOLIC BLOOD PRESSURE: 110 MMHG | BODY MASS INDEX: 27.98 KG/M2 | WEIGHT: 163 LBS | DIASTOLIC BLOOD PRESSURE: 74 MMHG

## 2019-05-06 DIAGNOSIS — Z97.5 NEXPLANON IN PLACE: Primary | ICD-10-CM

## 2019-05-06 PROCEDURE — 99212 OFFICE O/P EST SF 10 MIN: CPT | Performed by: NURSE PRACTITIONER

## 2019-05-06 ASSESSMENT — ENCOUNTER SYMPTOMS
CONSTITUTIONAL NEGATIVE: 1
CARDIOVASCULAR NEGATIVE: 1
NEUROLOGICAL NEGATIVE: 1
PSYCHIATRIC NEGATIVE: 1
MUSCULOSKELETAL NEGATIVE: 1
GASTROINTESTINAL NEGATIVE: 1
EYES NEGATIVE: 1
RESPIRATORY NEGATIVE: 1

## 2019-05-06 NOTE — NON-PROVIDER
Pt here for Nexplanon check. Insertion 4/11/19  Pt states no complaints  Good#[phone]  Pharmacy verified

## 2019-05-06 NOTE — PROGRESS NOTES
Subjective:      Araceli Vale is a 24 y.o. female who presents with Gynecologic Exam (Nexplanon check)    Araceli is here for Nexplanon check. She had device placed 4/11/19 and no complications were noted during insertion.  She states she is happy with device. She does have spotting every now and then. Denies headaches or mood swings.  Site is well healed with no signs of infection.    Discussed need to have device removed and replaced in 4/2021 or sooner for pregnancy. Discussed that her next pap is due 02/2020.    HPI    Review of Systems   Constitutional: Negative.    HENT: Negative.    Eyes: Negative.    Respiratory: Negative.    Cardiovascular: Negative.    Gastrointestinal: Negative.    Genitourinary: Negative.    Musculoskeletal: Negative.    Skin: Negative.    Neurological: Negative.    Endo/Heme/Allergies: Negative.    Psychiatric/Behavioral: Negative.    All other systems reviewed and are negative.         Objective:     /74   Wt 73.9 kg (163 lb)   LMP 04/11/2019   BMI 27.98 kg/m²      Physical Exam   Constitutional: She is oriented to person, place, and time. She appears well-developed and well-nourished.   Nexplanon palpable in upper left arm   HENT:   Head: Normocephalic.   Eyes: Conjunctivae are normal.   Neck: Normal range of motion.   Cardiovascular: Normal rate, regular rhythm and normal heart sounds.    Pulmonary/Chest: Effort normal and breath sounds normal.   Musculoskeletal: Normal range of motion.   Neurological: She is alert and oriented to person, place, and time.   Skin: Skin is warm and dry.   Psychiatric: She has a normal mood and affect. Her behavior is normal. Judgment and thought content normal.   Nursing note and vitals reviewed.         Assessment/Plan:     1. Nexplanon in place

## 2020-02-10 ENCOUNTER — TELEPHONE (OUTPATIENT)
Dept: OBGYN | Facility: CLINIC | Age: 25
End: 2020-02-10

## 2020-02-10 NOTE — TELEPHONE ENCOUNTER
02/10/20 1:43 PM    Pt called wanting to know how much it would cost for a self pay Nexplanon removal. Per Ki, approx $431.00. Pt understood and will call back when she wants to schedule.

## 2020-07-14 NOTE — CARE PLAN
Problem: Safety  Goal: Will remain free from falls  Outcome: PROGRESSING AS EXPECTED  Patient resting in bed with call light in reach. Bed in low position. Progressing as expected.     Problem: Infection  Goal: Will remain free from infection  Outcome: PROGRESSING AS EXPECTED  No S/S of infection present at this time. Will continue to monitor. Progressing as expected.        Detail Level: Zone Consent: The patient's consent was obtained including but not limited to risks of crusting, scabbing, blistering, scarring, darker or lighter pigmentary change, recurrence, incomplete removal and infection. Number Of Freeze-Thaw Cycles: 1 freeze-thaw cycle Post-Care Instructions: I reviewed with the patient in detail post-care instructions. Patient is to wear sunprotection, and avoid picking at any of the treated lesions. Pt may apply Vaseline to crusted or scabbing areas. Total Number Of Aks Treated: 6 Render In Bullet Format When Appropriate: No Duration Of Freeze Thaw-Cycle (Seconds): 5

## 2020-09-25 ENCOUNTER — GYNECOLOGY VISIT (OUTPATIENT)
Dept: OBGYN | Facility: CLINIC | Age: 25
End: 2020-09-25

## 2020-09-25 VITALS — WEIGHT: 149 LBS | SYSTOLIC BLOOD PRESSURE: 122 MMHG | BODY MASS INDEX: 25.58 KG/M2 | DIASTOLIC BLOOD PRESSURE: 70 MMHG

## 2020-09-25 DIAGNOSIS — Z30.46 NEXPLANON REMOVAL: Primary | ICD-10-CM

## 2020-09-25 PROCEDURE — 11982 REMOVE DRUG IMPLANT DEVICE: CPT | Performed by: NURSE PRACTITIONER

## 2020-09-25 RX ORDER — ACETAMINOPHEN AND CODEINE PHOSPHATE 120; 12 MG/5ML; MG/5ML
1 SOLUTION ORAL DAILY
Qty: 28 TAB | Refills: 11 | Status: SHIPPED | OUTPATIENT
Start: 2020-09-25 | End: 2021-01-25

## 2020-09-25 ASSESSMENT — FIBROSIS 4 INDEX: FIB4 SCORE: 0.56

## 2020-09-25 ASSESSMENT — ENCOUNTER SYMPTOMS
MUSCULOSKELETAL NEGATIVE: 1
RESPIRATORY NEGATIVE: 1
PSYCHIATRIC NEGATIVE: 1
GASTROINTESTINAL NEGATIVE: 1
CONSTITUTIONAL NEGATIVE: 1
CARDIOVASCULAR NEGATIVE: 1
NEUROLOGICAL NEGATIVE: 1
EYES NEGATIVE: 1

## 2020-09-25 NOTE — PROGRESS NOTES
Subjective:      Araceli Vale is a 25 y.o. female who presents with Procedure    Araceli is here today for Nexplanon removal. She had device placed in 04/2019, and since placement has been having irregular spotting and bleeding. We did discuss normalcy of ths and other options including JHONNY's to help control bleeding, but she declines. She desires to have device removed. Will use condoms and POP's afterwards.  Discussed removal procedure and all questions answered. See procedure note for details of removal.    She is due for pap, but would like to come back for that.    HPI    Review of Systems   Constitutional: Negative.    HENT: Negative.    Eyes: Negative.    Respiratory: Negative.    Cardiovascular: Negative.    Gastrointestinal: Negative.    Genitourinary: Negative.    Musculoskeletal: Negative.    Skin: Negative.    Neurological: Negative.    Endo/Heme/Allergies: Negative.    Psychiatric/Behavioral: Negative.    All other systems reviewed and are negative.         Objective:     /70   Wt 67.6 kg (149 lb)   LMP 09/11/2020   BMI 25.58 kg/m²      Physical Exam  Vitals signs and nursing note reviewed. Exam conducted with a chaperone present.   Constitutional:       Appearance: Normal appearance. She is normal weight.   HENT:      Head: Normocephalic.   Neck:      Musculoskeletal: Normal range of motion.   Cardiovascular:      Rate and Rhythm: Normal rate and regular rhythm.      Pulses: Normal pulses.      Heart sounds: Normal heart sounds.   Pulmonary:      Effort: Pulmonary effort is normal.      Breath sounds: Normal breath sounds.   Abdominal:      General: Abdomen is flat.      Palpations: Abdomen is soft.   Musculoskeletal: Normal range of motion.   Skin:     General: Skin is warm and dry.   Neurological:      General: No focal deficit present.      Mental Status: She is alert and oriented to person, place, and time.   Psychiatric:         Mood and Affect: Mood normal.         Behavior: Behavior  normal.         Thought Content: Thought content normal.         Judgment: Judgment normal.            Assessment/Plan:     1. Nexplanon removal  Removed from left arm without incident  - Consent for all Surgical, Special Diagnostic or Therapeutic Procedures

## 2020-09-25 NOTE — PROCEDURES
General Procedure    Date/Time: 9/25/2020 2:06 PM  Performed by: Sanjuanita Stapleton C.N.M.  Authorized by: Sanjuanita Stapleton C.N.M.   Preparation: Patient was prepped and draped in the usual sterile fashion.  Local anesthesia used: yes  Anesthesia: local infiltration    Anesthesia:  Local anesthesia used: yes  Local Anesthetic: lidocaine 1% with epinephrine  Anesthetic total: 3 mL    Sedation:  Patient sedated: no    Patient tolerance: patient tolerated the procedure well with no immediate complications        Procedure note: Nexplanon removal;     Informed consent for removal of Nexplanon is obtained from patient, Risks of the procedure are bleeding, infection, possible difficulty with removing the implant, possible breakage of the implant, possible scarring. Patient is told of the details of the procedure and signs the consent.  The patient is positioned with her left  arm in a 90 degree fashion, the implant is located and palpated.  5cc of 1% Lidocaine is injected subdermally into the area located  Betadine solution is used to cleanse the skin  After proper local anesthesia is obtained, a small incision is made with size 11 scalpel  The implant is worked out of the incision with small curved hemostat and sterile gauze  Wound is cleaned and steri-strips are placed over the incision   A pressure dressing is applied and encouraged to stay on for 24 hours   Then remove the dressing and leave the steri-strips on.  Watch for signs and symptoms of infection, redness at the site, purulent drainage.    No problems encountered    RX sent for POP's and discussed back up methods    Follow up as needed    Sanjuanita Stapleton CNM, APRN

## 2020-09-25 NOTE — NON-PROVIDER
Pt here for Nexplanon removal.     Pt states she is not happy with the nexplanon.   Good# [phone]  LMP: 9/11/2020  Pharmacy confirmed

## 2020-12-30 ENCOUNTER — GYNECOLOGY VISIT (OUTPATIENT)
Dept: OBGYN | Facility: CLINIC | Age: 25
End: 2020-12-30

## 2020-12-30 VITALS — SYSTOLIC BLOOD PRESSURE: 112 MMHG | DIASTOLIC BLOOD PRESSURE: 60 MMHG | WEIGHT: 157 LBS | BODY MASS INDEX: 26.95 KG/M2

## 2020-12-30 DIAGNOSIS — N93.8 DUB (DYSFUNCTIONAL UTERINE BLEEDING): ICD-10-CM

## 2020-12-30 LAB
INT CON NEG: NEGATIVE
INT CON POS: POSITIVE
POC URINE PREGNANCY TEST: POSITIVE

## 2020-12-30 PROCEDURE — 76830 TRANSVAGINAL US NON-OB: CPT | Performed by: OBSTETRICS & GYNECOLOGY

## 2020-12-30 PROCEDURE — 81025 URINE PREGNANCY TEST: CPT | Performed by: OBSTETRICS & GYNECOLOGY

## 2020-12-30 PROCEDURE — 99213 OFFICE O/P EST LOW 20 MIN: CPT | Mod: 25 | Performed by: OBSTETRICS & GYNECOLOGY

## 2020-12-30 ASSESSMENT — FIBROSIS 4 INDEX: FIB4 SCORE: 0.56

## 2020-12-30 NOTE — PROGRESS NOTES
Araceli Vale,  25 y.o.  female presents today with a C/O of :oligomenorrhea. Pt   Patient's last menstrual period was 2020 (exact date).       Subjective : Nausea/Vomiting: No:  Abdominal /pelvic cramping : No :   vaginal bleeding:No      Menstrual Flow : moderate   GYN ROS:  normal menses, no abnormal bleeding, pelvic pain or discharge, no breast pain or new or enlarging lumps on self exam, she complains of recent nexplanon , removal       History reviewed. No pertinent past medical history.    Past Surgical History:   Procedure Laterality Date   • DILATION AND CURETTAGE  3/17/2017    Procedure: DILATION AND CURETTAGE;  Surgeon: Vic Soler M.D.;  Location: LABOR AND DELIVERY;  Service:        Current Birth control:  none    OB History    Para Term  AB Living   5 3 2 1 1 2   SAB TAB Ectopic Molar Multiple Live Births   1     0   3      # Outcome Date GA Lbr Geovanny/2nd Weight Sex Delivery Anes PTL Lv   5 Current            4 Term 19 39w2d  2.59 kg (5 lb 11.4 oz) F Vag-Spont OTHER N KELLY   3  17 22w0d  0.385 kg (13.6 oz) F    ND      Birth Comments: D&C needed. partial molar pregnancy   2 Term 12/05/15 40w0d  2.3 kg (5 lb 1.1 oz) F Vag-Spont None N KELLY      Birth Comments: Denies any complications   1 SAB 2014 8w0d             Birth Comments: passed on its own           Allergy:      Patient has no known allergies.    Exam;    /60   Wt 71.2 kg (157 lb)   LMP 2020 (Exact Date)   BMI 26.95 kg/m²   well-appearing, well-hydrated, well-nourished, in no apparent distress, pleasant and verbal  normal;   PERRLA, EOMI, fundi grossly normal, no papilledema, no AV nicking, sclera clear  Clear  RRR No M  abdomen is soft without significant tenderness, masses, organomegaly or guarding  External genitalia normal, Vagina normal without dischargeLab.    Recent Results (from the past 336 hour(s))   POCT Pregnancy    Collection Time: 20  9:58 AM   Result Value Ref  Range    POC Urine Pregnancy Test Positive Negative    Internal Control Positive Positive     Internal Control Negative Negative      Ultrasound :     Per my Read   Transvaginal   First trimester findings: Intrauterine gestational sac seen: yes  Gestational sac summary: fetal pole seen, yolk sac seen, EGA: 7 weeks + 0 days, small subchorionic hemorrhage   Fetal cardiac activity: present at 140 bpm   Crown-rump length: .98 cm  MILLIE dates ( off nexplanon ) 08/15/2021  MILLIE scan 2021  Assessment:    missed/threatened     Plan:  2 weeks check progesterone , Needs to be above 15

## 2020-12-30 NOTE — NON-PROVIDER
Pt here for DUB.     Pt states she is still breastfeeding 2 yr old, would like to discuss   Good# [phone]  LMP: 11/8/2020  Pharmacy confirmed.

## 2020-12-31 ENCOUNTER — APPOINTMENT (OUTPATIENT)
Dept: OBGYN | Facility: CLINIC | Age: 25
End: 2020-12-31

## 2021-01-04 ENCOUNTER — APPOINTMENT (OUTPATIENT)
Dept: OBGYN | Facility: CLINIC | Age: 26
End: 2021-01-04

## 2021-01-25 ENCOUNTER — INITIAL PRENATAL (OUTPATIENT)
Dept: OBGYN | Facility: CLINIC | Age: 26
End: 2021-01-25

## 2021-01-25 ENCOUNTER — HOSPITAL ENCOUNTER (OUTPATIENT)
Facility: MEDICAL CENTER | Age: 26
End: 2021-01-25
Attending: PHYSICIAN ASSISTANT
Payer: COMMERCIAL

## 2021-01-25 VITALS — WEIGHT: 159.2 LBS | SYSTOLIC BLOOD PRESSURE: 104 MMHG | DIASTOLIC BLOOD PRESSURE: 60 MMHG | BODY MASS INDEX: 27.33 KG/M2

## 2021-01-25 DIAGNOSIS — Z34.90 ENCOUNTER FOR SUPERVISION OF NORMAL PREGNANCY, ANTEPARTUM, UNSPECIFIED GRAVIDITY: ICD-10-CM

## 2021-01-25 DIAGNOSIS — Z78.9 BREASTFEEDING (INFANT): ICD-10-CM

## 2021-01-25 DIAGNOSIS — Z34.81 ENCOUNTER FOR SUPERVISION OF NORMAL PREGNANCY IN MULTIGRAVIDA IN FIRST TRIMESTER: ICD-10-CM

## 2021-01-25 DIAGNOSIS — Z87.59 HISTORY OF MOLAR PREGNANCY: ICD-10-CM

## 2021-01-25 LAB
APPEARANCE UR: CLEAR
BILIRUB UR STRIP-MCNC: NORMAL MG/DL
COLOR UR AUTO: NORMAL
GLUCOSE UR STRIP.AUTO-MCNC: NEGATIVE MG/DL
KETONES UR STRIP.AUTO-MCNC: NEGATIVE MG/DL
LEUKOCYTE ESTERASE UR QL STRIP.AUTO: NORMAL
NITRITE UR QL STRIP.AUTO: NEGATIVE
PH UR STRIP.AUTO: 5.5 [PH] (ref 5–8)
PROT UR QL STRIP: NEGATIVE MG/DL
RBC UR QL AUTO: NEGATIVE
SP GR UR STRIP.AUTO: 1.01
UROBILINOGEN UR STRIP-MCNC: NORMAL MG/DL

## 2021-01-25 PROCEDURE — 8198 PREG CTR PKG RATE (SYSTEM): Performed by: PHYSICIAN ASSISTANT

## 2021-01-25 PROCEDURE — 81002 URINALYSIS NONAUTO W/O SCOPE: CPT | Performed by: PHYSICIAN ASSISTANT

## 2021-01-25 PROCEDURE — 0500F INITIAL PRENATAL CARE VISIT: CPT | Performed by: PHYSICIAN ASSISTANT

## 2021-01-25 ASSESSMENT — ENCOUNTER SYMPTOMS
CONSTITUTIONAL NEGATIVE: 1
PSYCHIATRIC NEGATIVE: 1
NEUROLOGICAL NEGATIVE: 1
CARDIOVASCULAR NEGATIVE: 1
MUSCULOSKELETAL NEGATIVE: 1
GASTROINTESTINAL NEGATIVE: 1
EYES NEGATIVE: 1
RESPIRATORY NEGATIVE: 1

## 2021-01-25 ASSESSMENT — EDINBURGH POSTNATAL DEPRESSION SCALE (EPDS)
THINGS HAVE BEEN GETTING ON TOP OF ME: NO, I HAVE BEEN COPING AS WELL AS EVER
I HAVE BLAMED MYSELF UNNECESSARILY WHEN THINGS WENT WRONG: NO, NEVER
I HAVE BEEN SO UNHAPPY THAT I HAVE HAD DIFFICULTY SLEEPING: NOT AT ALL
I HAVE BEEN ANXIOUS OR WORRIED FOR NO GOOD REASON: NO, NOT AT ALL
I HAVE BEEN ABLE TO LAUGH AND SEE THE FUNNY SIDE OF THINGS: AS MUCH AS I ALWAYS COULD
THE THOUGHT OF HARMING MYSELF HAS OCCURRED TO ME: NEVER
I HAVE BEEN SO UNHAPPY THAT I HAVE BEEN CRYING: NO, NEVER
TOTAL SCORE: 0
I HAVE LOOKED FORWARD WITH ENJOYMENT TO THINGS: AS MUCH AS I EVER DID
I HAVE FELT SAD OR MISERABLE: NO, NOT AT ALL
I HAVE FELT SCARED OR PANICKY FOR NO GOOD REASON: NO, NOT AT ALL

## 2021-01-25 NOTE — PROGRESS NOTES
Subjective:      Araceli Vale is a 26 y.o. female who presents with New ob visit. Pt sure of LMP and US at 7 wk confirms MILLIE 8/15/21. Pt has hx of 2  at term without complications, denies PIH, GDM or delivery complications. Pt also had SAB without complications and termination at 22wk for partial molar pregnancy without complications. Pt denies PMHx, SHx besides D&C without surgical or anesthetic complications. NKDA. Takign PNV gummies only - pt to add Fe supplement, as she doesn't like to take large pills. Denies tob, etoh, vaping or drug use. Pt currently denies cramping, bleeding or pain. No FM.             HPI    Review of Systems   Constitutional: Negative.    HENT: Negative.    Eyes: Negative.    Respiratory: Negative.    Cardiovascular: Negative.    Gastrointestinal: Negative.    Genitourinary: Negative.    Musculoskeletal: Negative.    Skin: Negative.    Neurological: Negative.    Endo/Heme/Allergies: Negative.    Psychiatric/Behavioral: Negative.    All other systems reviewed and are negative.         Objective:     /60   Wt 72.2 kg (159 lb 3.2 oz)   LMP 2020 (Exact Date)   BMI 27.33 kg/m²      Physical Exam  Vitals signs reviewed.   Constitutional:       Appearance: She is well-developed.   HENT:      Head: Normocephalic and atraumatic.   Eyes:      Pupils: Pupils are equal, round, and reactive to light.   Neck:      Musculoskeletal: Normal range of motion and neck supple.      Thyroid: No thyromegaly.   Cardiovascular:      Rate and Rhythm: Normal rate and regular rhythm.      Heart sounds: Normal heart sounds.   Pulmonary:      Effort: Pulmonary effort is normal. No respiratory distress.      Breath sounds: Normal breath sounds.   Abdominal:      General: Bowel sounds are normal. There is no distension.      Palpations: Abdomen is soft.      Tenderness: There is no abdominal tenderness.   Genitourinary:     Exam position: Supine.      Labia:         Right: No rash or tenderness.          Left: No rash or tenderness.       Vagina: Normal. No signs of injury and foreign body. No vaginal discharge or erythema.      Cervix: No cervical motion tenderness.      Uterus: Not deviated, not enlarged and not tender.       Adnexa:         Right: No mass or tenderness.          Left: No mass or tenderness.     Skin:     General: Skin is warm and dry.      Findings: No erythema.   Neurological:      Mental Status: She is alert.      Deep Tendon Reflexes: Reflexes are normal and symmetric.   Psychiatric:         Behavior: Behavior normal.         Thought Content: Thought content normal.       Unable to hear FHT by Doppler, so BSUS done with single viable IUP with CRL c/w 11w1d, +cardiac activity at 160bpm          Assessment/Plan:        1. Encounter for supervision of normal pregnancy, antepartum, unspecified   - F/u 4 wk, US 7-9wk  - SAB precautions reviewed  - PRENATAL PANEL 3+HIV+HCV; Future  - THINPREP RFLX HPV ASCUS W/CTNG; Future  - URINE DRUG SCREEN W/CONF (AR); Future  - US-OB 2ND 3RD TRI COMPLETE; Future  - POCT Urinalysis  - URINALYSIS,CULTURE IF INDICATED; Future    2. Encounter for supervision of normal pregnancy in multigravida in first trimester    3. History of molar pregnancy    4. Breastfeeding in pregnancy  - Only at night, trying to wean  - D/w pt incr need for caloric intake and incr water

## 2021-01-25 NOTE — PROGRESS NOTES
NOB today  LMP: 11/08/2020  Last pap: due for one today  Phone # [phone]  Pharmacy confirmed  On PNV  Cystic Fibrosis test offered.  Flu vaccine pt would like to think about it, please ask at her next office visit  C/o Pt states having some pressure in her vaginal area.

## 2021-01-26 DIAGNOSIS — Z34.90 ENCOUNTER FOR SUPERVISION OF NORMAL PREGNANCY, ANTEPARTUM, UNSPECIFIED GRAVIDITY: ICD-10-CM

## 2021-01-27 LAB
C TRACH DNA GENITAL QL NAA+PROBE: NEGATIVE
CYTOLOGY REG CYTOL: NORMAL
N GONORRHOEA DNA GENITAL QL NAA+PROBE: NEGATIVE
SPECIMEN SOURCE: NORMAL

## 2021-02-08 ENCOUNTER — TELEPHONE (OUTPATIENT)
Dept: OBGYN | Facility: CLINIC | Age: 26
End: 2021-02-08

## 2021-02-08 NOTE — TELEPHONE ENCOUNTER
Pt called triage line stating she is 13 weeks, has her daughter that weighs about 24 pounds she is aware that she was told not to carrying anything greater than 20 pounds but she carried her daughter and she is now having some upper abdominal pain, denies bleeding notified pt of SAB precautions and advised her that if she experiences any of these symptoms she should go to [facility] ER for further evaluation. Pt verbalized understanding.

## 2021-02-12 ENCOUNTER — HOSPITAL ENCOUNTER (OUTPATIENT)
Dept: LAB | Facility: MEDICAL CENTER | Age: 26
End: 2021-02-12
Attending: PHYSICIAN ASSISTANT
Payer: COMMERCIAL

## 2021-02-12 DIAGNOSIS — Z34.90 ENCOUNTER FOR SUPERVISION OF NORMAL PREGNANCY, ANTEPARTUM, UNSPECIFIED GRAVIDITY: ICD-10-CM

## 2021-02-12 LAB
ABO GROUP BLD: NORMAL
APPEARANCE UR: CLEAR
BACTERIA #/AREA URNS HPF: NEGATIVE /HPF
BASOPHILS # BLD AUTO: 0.3 % (ref 0–1.8)
BASOPHILS # BLD: 0.02 K/UL (ref 0–0.12)
BILIRUB UR QL STRIP.AUTO: NEGATIVE
BLD GP AB SCN SERPL QL: NORMAL
COLOR UR: YELLOW
EOSINOPHIL # BLD AUTO: 0.07 K/UL (ref 0–0.51)
EOSINOPHIL NFR BLD: 1 % (ref 0–6.9)
EPI CELLS #/AREA URNS HPF: ABNORMAL /HPF
ERYTHROCYTE [DISTWIDTH] IN BLOOD BY AUTOMATED COUNT: 42.1 FL (ref 35.9–50)
GLUCOSE UR STRIP.AUTO-MCNC: NEGATIVE MG/DL
HBV SURFACE AG SER QL: ABNORMAL
HCT VFR BLD AUTO: 39 % (ref 37–47)
HCV AB SER QL: ABNORMAL
HGB BLD-MCNC: 13 G/DL (ref 12–16)
HIV 1+2 AB+HIV1 P24 AG SERPL QL IA: NORMAL
IMM GRANULOCYTES # BLD AUTO: 0.04 K/UL (ref 0–0.11)
IMM GRANULOCYTES NFR BLD AUTO: 0.6 % (ref 0–0.9)
KETONES UR STRIP.AUTO-MCNC: NEGATIVE MG/DL
LEUKOCYTE ESTERASE UR QL STRIP.AUTO: ABNORMAL
LYMPHOCYTES # BLD AUTO: 1.37 K/UL (ref 1–4.8)
LYMPHOCYTES NFR BLD: 19.6 % (ref 22–41)
MCH RBC QN AUTO: 27.3 PG (ref 27–33)
MCHC RBC AUTO-ENTMCNC: 33.3 G/DL (ref 33.6–35)
MCV RBC AUTO: 81.9 FL (ref 81.4–97.8)
MICRO URNS: ABNORMAL
MONOCYTES # BLD AUTO: 0.42 K/UL (ref 0–0.85)
MONOCYTES NFR BLD AUTO: 6 % (ref 0–13.4)
NEUTROPHILS # BLD AUTO: 5.07 K/UL (ref 2–7.15)
NEUTROPHILS NFR BLD: 72.5 % (ref 44–72)
NITRITE UR QL STRIP.AUTO: NEGATIVE
NRBC # BLD AUTO: 0 K/UL
NRBC BLD-RTO: 0 /100 WBC
PH UR STRIP.AUTO: 7.5 [PH] (ref 5–8)
PLATELET # BLD AUTO: 224 K/UL (ref 164–446)
PMV BLD AUTO: 10 FL (ref 9–12.9)
PROT UR QL STRIP: NEGATIVE MG/DL
RBC # BLD AUTO: 4.76 M/UL (ref 4.2–5.4)
RBC # URNS HPF: ABNORMAL /HPF
RBC UR QL AUTO: NEGATIVE
RENAL EPI CELLS #/AREA URNS HPF: ABNORMAL /HPF
RH BLD: NORMAL
RUBV AB SER QL: 372 IU/ML
SP GR UR STRIP.AUTO: 1.01
TREPONEMA PALLIDUM IGG+IGM AB [PRESENCE] IN SERUM OR PLASMA BY IMMUNOASSAY: ABNORMAL
UROBILINOGEN UR STRIP.AUTO-MCNC: 0.2 MG/DL
WBC # BLD AUTO: 7 K/UL (ref 4.8–10.8)
WBC #/AREA URNS HPF: ABNORMAL /HPF

## 2021-02-14 LAB
AMPHET CTO UR CFM-MCNC: NEGATIVE NG/ML
BARBITURATES CTO UR CFM-MCNC: NEGATIVE NG/ML
BENZODIAZ CTO UR CFM-MCNC: NEGATIVE NG/ML
CANNABINOIDS CTO UR CFM-MCNC: NEGATIVE NG/ML
COCAINE CTO UR CFM-MCNC: NEGATIVE NG/ML
DRUG COMMENT 753798: NORMAL
METHADONE CTO UR CFM-MCNC: NEGATIVE NG/ML
OPIATES CTO UR CFM-MCNC: NEGATIVE NG/ML
PCP CTO UR CFM-MCNC: NEGATIVE NG/ML
PROPOXYPH CTO UR CFM-MCNC: NEGATIVE NG/ML

## 2021-02-22 ENCOUNTER — ROUTINE PRENATAL (OUTPATIENT)
Dept: OBGYN | Facility: CLINIC | Age: 26
End: 2021-02-22

## 2021-02-22 VITALS — SYSTOLIC BLOOD PRESSURE: 110 MMHG | DIASTOLIC BLOOD PRESSURE: 60 MMHG | BODY MASS INDEX: 27.74 KG/M2 | WEIGHT: 161.6 LBS

## 2021-02-22 DIAGNOSIS — Z34.80 SUPERVISION OF OTHER NORMAL PREGNANCY, ANTEPARTUM: Primary | ICD-10-CM

## 2021-02-22 PROCEDURE — 90686 IIV4 VACC NO PRSV 0.5 ML IM: CPT | Performed by: NURSE PRACTITIONER

## 2021-02-22 PROCEDURE — 90040 PR PRENATAL FOLLOW UP: CPT | Performed by: NURSE PRACTITIONER

## 2021-02-22 PROCEDURE — 90471 IMMUNIZATION ADMIN: CPT | Performed by: NURSE PRACTITIONER

## 2021-02-22 NOTE — PROGRESS NOTES
S) Pt is a 26 y.o.   at 15w1d  gestation. Routine prenatal care today. No complaints today. States she had some abdominal pain a few weeks ago while she was carrying her other child. Discussed lifting precautions and reiterated normalcy of discomforts associated with lifting in pregnancy. AFP ordered today, and US is scheduled. PTL/SAB precautions reviewed, all questions answered.   Fetal movement None appreciated yet  Cramping no  VB no  LOF no   Denies dysuria. Generally feels well today. Good self-care activities identified. Denies headaches, swelling, visual changes, or epigastric pain .     O) /60   Wt 73.3 kg (161 lb 9.6 oz)         Labs:       PNL: WNL       GCT: too early        AFP: Ordered today       GBS: N/A       Pertinent ultrasound -        Scheduled for 21    A) IUP at 15w1d       S=D    BSUS done, positive fetal movement noted, and FHR noted to be 155.         Patient Active Problem List    Diagnosis Date Noted   • Supervision of other normal pregnancy, antepartum 2021   • Breastfeeding during pregnancy 2021   • History of molar pregnancy 2018          SVE: deferred       Chaperone offered: n/a         TDAP: no       FLU: yes        BTL: no       : n/a       C/S Consent: n/a       IOL or C/S scheduled: no       LAST PAP: 21- negative         P) s/s ptl vs general discomforts. Fetal movements reviewed. General ed and anticipatory guidance. Nutrition/exercise/vitamin. Plans breast Plans pp contraception- unsure  Continue PNV.

## 2021-02-22 NOTE — PROGRESS NOTES
Pt. Here for OB/FU.   Good # [phone]  Pt. Denies VB, LOF, or UC's.   Pharmacy verified.   Chaperone offered and not indicated  PT states no concerns at the moment.   U/s on 04/02/2020  Flu vaccine today  Flu vaccine given today, Left deltoid. Screening checklist reviewed with pt. Verified by [facility] vicente

## 2021-03-12 ENCOUNTER — HOSPITAL ENCOUNTER (OUTPATIENT)
Dept: LAB | Facility: MEDICAL CENTER | Age: 26
End: 2021-03-12
Attending: NURSE PRACTITIONER
Payer: COMMERCIAL

## 2021-03-12 DIAGNOSIS — Z34.80 SUPERVISION OF OTHER NORMAL PREGNANCY, ANTEPARTUM: ICD-10-CM

## 2021-03-15 LAB
# FETUSES US: NORMAL
AFP MOM SERPL: 0.83
AFP SERPL-MCNC: 32 NG/ML
AGE - REPORTED: 26.6 YR
CURRENT SMOKER: NO
FAMILY MEMBER DISEASES HX: NO
GA METHOD: NORMAL
GA: NORMAL WK
HCG MOM SERPL: 0.48
HCG SERPL-ACNC: NORMAL IU/L
HX OF HEREDITARY DISORDERS: NO
IDDM PATIENT QL: NO
INHIBIN A MOM SERPL: 0.7
INHIBIN A SERPL-MCNC: 95 PG/ML
INTEGRATED SCN PATIENT-IMP: NORMAL
PATHOLOGY STUDY: NORMAL
SPECIMEN DRAWN SERPL: NORMAL
U ESTRIOL MOM SERPL: 0.75
U ESTRIOL SERPL-MCNC: 1.21 NG/ML

## 2021-03-16 ENCOUNTER — TELEPHONE (OUTPATIENT)
Dept: OBGYN | Facility: CLINIC | Age: 26
End: 2021-03-16

## 2021-03-16 NOTE — TELEPHONE ENCOUNTER
Called patient in regards to AFP results, was unable to reach patient. Left message to call back. Patient called back, let her know that her AFP results came back normal. Pt is aware and had no further questions.

## 2021-03-17 ENCOUNTER — TELEPHONE (OUTPATIENT)
Dept: OBGYN | Facility: CLINIC | Age: 26
End: 2021-03-17

## 2021-03-17 NOTE — TELEPHONE ENCOUNTER
----- Message from EDDIE Velazquez sent at 3/15/2021  2:02 PM PDT -----  WNL noted.    Pt understood and had no further questions about AFP results

## 2021-03-22 ENCOUNTER — ROUTINE PRENATAL (OUTPATIENT)
Dept: OBGYN | Facility: CLINIC | Age: 26
End: 2021-03-22

## 2021-03-22 DIAGNOSIS — Z34.82 ENCOUNTER FOR SUPERVISION OF OTHER NORMAL PREGNANCY IN SECOND TRIMESTER: Primary | ICD-10-CM

## 2021-03-22 PROCEDURE — 90040 PR PRENATAL FOLLOW UP: CPT | Performed by: NURSE PRACTITIONER

## 2021-03-22 NOTE — PROGRESS NOTES
OB follow up   + fetal movement. Active  No VB, LOF or UC's.  Flu vaccine offered Received   Phone # [phone]  Preferred pharmacy confirmed.  US 04/02/2021  No complaints as of today  Up to date on everything

## 2021-03-22 NOTE — PROGRESS NOTES
S:  Pt is  at 19w1d here for routine OB follow up.  No concerns today.  No ED or hospital visits since last seen. Reports good FM.  Denies VB, LOF, RUCs, or vaginal DC.     O:  Please see above vitals.        FHTs: 145        Fundal ht: 19 cm.        AFP: negative -- reviewed w pt.    Complete OB US  Scheduled for 3/4    A:  IUP 19w1d  Patient Active Problem List    Diagnosis Date Noted   • Supervision of other normal pregnancy, antepartum 2021   • Breastfeeding during pregnancy 2021   • History of molar pregnancy 2018        P:  1.  Reviewed labs, ultrasound w pt.          2.  Questions answered.          3.  Encouraged adequate water intake        4.  F/u 4 wks.

## 2021-04-02 ENCOUNTER — APPOINTMENT (OUTPATIENT)
Dept: RADIOLOGY | Facility: IMAGING CENTER | Age: 26
End: 2021-04-02
Attending: PHYSICIAN ASSISTANT

## 2021-04-02 DIAGNOSIS — Z34.90 ENCOUNTER FOR SUPERVISION OF NORMAL PREGNANCY, ANTEPARTUM, UNSPECIFIED GRAVIDITY: ICD-10-CM

## 2021-04-16 ENCOUNTER — ROUTINE PRENATAL (OUTPATIENT)
Dept: OBGYN | Facility: CLINIC | Age: 26
End: 2021-04-16

## 2021-04-16 VITALS — WEIGHT: 169 LBS | SYSTOLIC BLOOD PRESSURE: 104 MMHG | BODY MASS INDEX: 29.01 KG/M2 | DIASTOLIC BLOOD PRESSURE: 62 MMHG

## 2021-04-16 DIAGNOSIS — Z34.80 SUPERVISION OF OTHER NORMAL PREGNANCY, ANTEPARTUM: Primary | ICD-10-CM

## 2021-04-16 PROCEDURE — 90040 PR PRENATAL FOLLOW UP: CPT | Performed by: NURSE PRACTITIONER

## 2021-04-16 NOTE — PROGRESS NOTES
OB follow up   + fetal movement.  No VB, LOF or UC's.  Phone # [phone]  Preferred pharmacy confirmed.

## 2021-04-16 NOTE — PROGRESS NOTES
S:  Pt is  at 22w5d here for routine OB follow up.  No c/o today.  Reports good FM.  Denies VB, LOF, RUCs, or vaginal DC.  Denies cough, SOB, sore throat or fever.      O:    Vitals:    21 1014   BP: 104/62   Weight: 76.7 kg (169 lb)             FHTs: 150        Fundal ht: 22 cm.    Complete OB US     2021 2:28 PM     HISTORY/REASON FOR EXAM:  Evaluate fetal anatomy.     TECHNIQUE/EXAM DESCRIPTION: OB complete ultrasound.     COMPARISON:  None     FINDINGS:  Fetal Lie:  Breech  LMP:  2020  Clinical MILLIE by LMP:  8/15/2021     Placenta (Location):  Anterior  Placenta Previa: No  Placental ndGndrndanddndend:nd nd2nd Amniotic Fluid Volume:  WILLIAMS = 13.12 cm     Fetal Heart Rate:  153 bpm     Cervical Length:  3.81 cm     No maternal adnexal mass is identified.     Umbilical Artery S/D Ratio(s):  Not applicable     Fetal Anatomy  (Seen or Not Seen)  Lateral Ventricles     Seen  Cisterna Magna        Seen  Cerebellum              Seen  CSP             Seen  Orbits             Seen  Face/Lips                Seen  Cord Insertion         Seen  Placental CI         Seen  4 Chamber Heart     Seen  LVOT               Seen  RVOT              Seen  3 Vessel View     Seen  Stomach       Seen  Kidneys                   Seen  Urinary Bladder      Seen  Spine                       Seen  3 Vessel Cord          Seen  Both Upper Extremities    Seen  Both Lower Extremities    Seen  Diaphragm             Seen  Movement       Seen  Gender:  Likely female     Fetal Biometry  BPD    4.56 cm, 19 weeks 5 days, (15.0%)  HC    17.31 cm, 19 weeks 6 days, (10.4%)  AC    14.56 cm, 19 weeks 6 days, (18.4%)  Femur Length    3.26 cm, 20 weeks 1 day, (23.6%)  Humerus Length    3.03 cm, 20 weeks 0 days, (22.4%)  Cerebellum Diameter   2.00 cm, 19 weeks 2 days, (17.0%)     EGA by this US:  19 weeks 6 days  MILLIE by this US: 2021  MILLIE by 1st US:  2021     Estimated Fetal Weight:  325 grams  EFW Percentile:  13.9%     Comments:     IMPRESSION:     1.  Single intrauterine pregnancy of an estimated gestational age of 19 weeks 6 days with an estimated date of delivery of 8/21/2021.  2.  Fetal survey within normal limits.    A:  IUP 22w5d  Patient Active Problem List    Diagnosis Date Noted   • Supervision of other normal pregnancy, antepartum 01/25/2021   • Breastfeeding during pregnancy 01/25/2021   • History of molar pregnancy 07/02/2018        P:  1.  Reviewed ultrasound w pt.          2.  Questions answered.          3.  Encouraged adequate water intake        4.  F/u 4 wks.

## 2021-04-16 NOTE — PATIENT INSTRUCTIONS
P:  1.  Reviewed ultrasound w pt.          2.  Questions answered.          3.  Encouraged adequate water intake        4.  F/u 4 wks.

## 2021-05-14 ENCOUNTER — ROUTINE PRENATAL (OUTPATIENT)
Dept: OBGYN | Facility: CLINIC | Age: 26
End: 2021-05-14

## 2021-05-14 VITALS — SYSTOLIC BLOOD PRESSURE: 110 MMHG | WEIGHT: 175.8 LBS | DIASTOLIC BLOOD PRESSURE: 56 MMHG | BODY MASS INDEX: 30.18 KG/M2

## 2021-05-14 DIAGNOSIS — Z34.80 SUPERVISION OF OTHER NORMAL PREGNANCY, ANTEPARTUM: Primary | ICD-10-CM

## 2021-05-14 PROCEDURE — 90040 PR PRENATAL FOLLOW UP: CPT | Performed by: NURSE PRACTITIONER

## 2021-05-14 NOTE — NON-PROVIDER
Pt. Here for OB/FU. Reports Good FM.   Good # [phone]  Pt. Denies VB, LOF, or UC's.   Pharmacy verified.   Chaperone offered and not indicated  Pt states that she had a little bit of bleeding yesterday.   3rd Trimester labs given today along with instructions

## 2021-05-28 ENCOUNTER — HOSPITAL ENCOUNTER (OUTPATIENT)
Dept: LAB | Facility: MEDICAL CENTER | Age: 26
End: 2021-05-28
Attending: NURSE PRACTITIONER
Payer: COMMERCIAL

## 2021-05-28 DIAGNOSIS — Z34.80 SUPERVISION OF OTHER NORMAL PREGNANCY, ANTEPARTUM: ICD-10-CM

## 2021-05-28 LAB
ERYTHROCYTE [DISTWIDTH] IN BLOOD BY AUTOMATED COUNT: 40.5 FL (ref 35.9–50)
GLUCOSE 1H P 50 G GLC PO SERPL-MCNC: 108 MG/DL (ref 70–139)
HCT VFR BLD AUTO: 38.1 % (ref 37–47)
HGB BLD-MCNC: 12.3 G/DL (ref 12–16)
MCH RBC QN AUTO: 26.4 PG (ref 27–33)
MCHC RBC AUTO-ENTMCNC: 32.3 G/DL (ref 33.6–35)
MCV RBC AUTO: 81.8 FL (ref 81.4–97.8)
PLATELET # BLD AUTO: 233 K/UL (ref 164–446)
PMV BLD AUTO: 10.1 FL (ref 9–12.9)
RBC # BLD AUTO: 4.66 M/UL (ref 4.2–5.4)
TREPONEMA PALLIDUM IGG+IGM AB [PRESENCE] IN SERUM OR PLASMA BY IMMUNOASSAY: NORMAL
WBC # BLD AUTO: 7.8 K/UL (ref 4.8–10.8)

## 2021-06-11 ENCOUNTER — ROUTINE PRENATAL (OUTPATIENT)
Dept: OBGYN | Facility: CLINIC | Age: 26
End: 2021-06-11

## 2021-06-11 VITALS — BODY MASS INDEX: 30.76 KG/M2 | SYSTOLIC BLOOD PRESSURE: 102 MMHG | WEIGHT: 179.2 LBS | DIASTOLIC BLOOD PRESSURE: 60 MMHG

## 2021-06-11 DIAGNOSIS — Z34.80 SUPERVISION OF OTHER NORMAL PREGNANCY, ANTEPARTUM: ICD-10-CM

## 2021-06-11 PROCEDURE — 90715 TDAP VACCINE 7 YRS/> IM: CPT | Performed by: PHYSICIAN ASSISTANT

## 2021-06-11 PROCEDURE — 90040 PR PRENATAL FOLLOW UP: CPT | Performed by: PHYSICIAN ASSISTANT

## 2021-06-11 PROCEDURE — 90471 IMMUNIZATION ADMIN: CPT | Performed by: PHYSICIAN ASSISTANT

## 2021-06-11 NOTE — PROGRESS NOTES
Pt. Here for OB/F/U, Kick Count sheet given and explained to pt.   Good FM  Good # [phone]  Pt. States no complaints.   Pharmacy verified.   Chaperone offered and not needed.   BTL offered unsure and please ask on next visit.   Tdap vaccine given today, left deltoid. Screening checklist reviewed with pt verified by [facility] GRIER

## 2021-06-11 NOTE — PROGRESS NOTES
Pt has no complaints with cramping, bleeding or pain though pt has had low back pain. +FM - FKC sheet given today. 1hr GTT, CBC, T pallidium wnl - pt notified of results. PTL precautions reviewed. TDaP given today. Pt unsure about BTL but will let us know next visit. RTC 2 wk or sooner prn.

## 2021-06-25 ENCOUNTER — ROUTINE PRENATAL (OUTPATIENT)
Dept: OBGYN | Facility: CLINIC | Age: 26
End: 2021-06-25

## 2021-06-25 VITALS — WEIGHT: 181 LBS | SYSTOLIC BLOOD PRESSURE: 110 MMHG | BODY MASS INDEX: 31.07 KG/M2 | DIASTOLIC BLOOD PRESSURE: 60 MMHG

## 2021-06-25 DIAGNOSIS — Z34.80 SUPERVISION OF OTHER NORMAL PREGNANCY, ANTEPARTUM: Primary | ICD-10-CM

## 2021-06-25 PROCEDURE — 90040 PR PRENATAL FOLLOW UP: CPT | Performed by: NURSE PRACTITIONER

## 2021-06-25 NOTE — NON-PROVIDER
OB follow up   + fetal movement.  No VB, LOF or UC's.  Phone # [phone]  Preferred pharmacy confirmed.

## 2021-07-09 ENCOUNTER — ROUTINE PRENATAL (OUTPATIENT)
Dept: OBGYN | Facility: CLINIC | Age: 26
End: 2021-07-09
Payer: MEDICAID

## 2021-07-09 VITALS — WEIGHT: 180 LBS | BODY MASS INDEX: 30.9 KG/M2 | SYSTOLIC BLOOD PRESSURE: 112 MMHG | DIASTOLIC BLOOD PRESSURE: 62 MMHG

## 2021-07-09 DIAGNOSIS — Z34.80 SUPERVISION OF OTHER NORMAL PREGNANCY, ANTEPARTUM: Primary | ICD-10-CM

## 2021-07-09 PROCEDURE — 90040 PR PRENATAL FOLLOW UP: CPT | Performed by: NURSE PRACTITIONER

## 2021-07-09 NOTE — PROGRESS NOTES
OB follow up   + fetal movement.  No VB, LOF or UC's.  Phone # [phone]  Preferred pharmacy confirmed.  BTL declined

## 2021-07-23 ENCOUNTER — ROUTINE PRENATAL (OUTPATIENT)
Dept: OBGYN | Facility: CLINIC | Age: 26
End: 2021-07-23
Payer: MEDICAID

## 2021-07-23 VITALS — DIASTOLIC BLOOD PRESSURE: 72 MMHG | WEIGHT: 182 LBS | BODY MASS INDEX: 31.24 KG/M2 | SYSTOLIC BLOOD PRESSURE: 102 MMHG

## 2021-07-23 DIAGNOSIS — Z34.80 SUPERVISION OF OTHER NORMAL PREGNANCY, ANTEPARTUM: Primary | ICD-10-CM

## 2021-07-23 PROCEDURE — 90040 PR PRENATAL FOLLOW UP: CPT | Performed by: NURSE PRACTITIONER

## 2021-07-23 NOTE — PROGRESS NOTES
S:  Reports losing her mucous plug.  Would like SVE today.  Has questions about policies in the hospital in labor.  Reports good FM.  Denies VB, LOF, RUCs, or vaginal DC.     O:    Vitals:    07/23/21 0905   BP: 102/72   Weight: 82.6 kg (182 lb)     See flow sheet.    A:  IUP at 36w5d  Patient Active Problem List    Diagnosis Date Noted   • Supervision of other normal pregnancy, antepartum 01/25/2021   • Breastfeeding during pregnancy 01/25/2021   • History of molar pregnancy 07/02/2018       P:  1.  GBS obtained.          2.  Labor precautions given.  Instructions given on where to go.  Pt receptive to              education.          3.  Questions answered.          4.  Continue FKCs.          5.  Encouraged adequate water intake        6.  F/u 1 wk.        7.  L&D policies reviewed w pt.     Chaperone offered and provided by Violeta Bagley MA.

## 2021-07-23 NOTE — PROGRESS NOTES
OB follow up   + fetal movement.  No VB, LOF or UC's.  Phone # [phone]  Preferred pharmacy confirmed.  GBS today

## 2021-07-29 DIAGNOSIS — Z34.80 SUPERVISION OF OTHER NORMAL PREGNANCY, ANTEPARTUM: ICD-10-CM

## 2021-07-30 ENCOUNTER — ROUTINE PRENATAL (OUTPATIENT)
Dept: OBGYN | Facility: CLINIC | Age: 26
End: 2021-07-30
Payer: MEDICAID

## 2021-07-30 VITALS — WEIGHT: 182 LBS | BODY MASS INDEX: 31.24 KG/M2 | SYSTOLIC BLOOD PRESSURE: 108 MMHG | DIASTOLIC BLOOD PRESSURE: 62 MMHG

## 2021-07-30 DIAGNOSIS — O99.820 GBS (GROUP B STREPTOCOCCUS CARRIER), +RV CULTURE, CURRENTLY PREGNANT: ICD-10-CM

## 2021-07-30 DIAGNOSIS — Z34.80 SUPERVISION OF OTHER NORMAL PREGNANCY, ANTEPARTUM: Primary | ICD-10-CM

## 2021-07-30 PROCEDURE — 90040 PR PRENATAL FOLLOW UP: CPT | Performed by: NURSE PRACTITIONER

## 2021-07-30 NOTE — PROGRESS NOTES
S:  Reports some incr vaginal DC but denies any itching or odor.  Reports good FM.  Denies VB, LOF, RUCs, or vaginal DC.     O:    Vitals:    07/30/21 1300   BP: 108/62   Weight: 82.6 kg (182 lb)     See flow sheet.    A:  IUP at 37w5d  Patient Active Problem List    Diagnosis Date Noted   • GBS (group B Streptococcus carrier), +RV culture, currently pregnant 07/30/2021   • Supervision of other normal pregnancy, antepartum 01/25/2021   • Breastfeeding during pregnancy 01/25/2021   • History of molar pregnancy 07/02/2018       P:  1.  Continue FKCs.         2.  Labor precautions given.  Instructions given on where to go.  Pt receptive to education.         3.  Questions answered.         4.  Encouraged adequate water intake       5.  F/u 1wk       6.  GBS positive - reviewed w pt.       7.  L&D policies reviewed.    Chaperone offered and provided by Violeta Bagley MA.

## 2021-07-30 NOTE — PROGRESS NOTES
OB follow up   + fetal movement.  No VB, LOF or UC's.  Phone # [phone]  Preferred pharmacy confirmed.  GBS positive

## 2021-08-02 ENCOUNTER — HOSPITAL ENCOUNTER (EMERGENCY)
Facility: MEDICAL CENTER | Age: 26
End: 2021-08-02
Attending: OBSTETRICS & GYNECOLOGY | Admitting: OBSTETRICS & GYNECOLOGY
Payer: MEDICAID

## 2021-08-02 VITALS
HEART RATE: 92 BPM | OXYGEN SATURATION: 99 % | TEMPERATURE: 98.5 F | DIASTOLIC BLOOD PRESSURE: 84 MMHG | RESPIRATION RATE: 18 BRPM | SYSTOLIC BLOOD PRESSURE: 140 MMHG

## 2021-08-02 PROCEDURE — 59025 FETAL NON-STRESS TEST: CPT

## 2021-08-02 PROCEDURE — 302449 STATCHG TRIAGE ONLY (STATISTIC)

## 2021-08-02 NOTE — PROGRESS NOTES
"EDC 8/15 38       1225-pt presents from home with c/o some uc's this am and \"just uncomfortable\", no c/o leaking, bleeding or other pain, states baby is moving normally, placed on external monitors, vs taken, SVE closed/thick/high  1245-TC Dr Fraier, report given, discharge order received  1255-pt discharged home with labor precautions, verbalized understanding, left ambulatory with SO  "

## 2021-08-05 ENCOUNTER — HOSPITAL ENCOUNTER (EMERGENCY)
Facility: MEDICAL CENTER | Age: 26
End: 2021-08-05
Attending: OBSTETRICS & GYNECOLOGY | Admitting: OBSTETRICS & GYNECOLOGY
Payer: MEDICAID

## 2021-08-05 VITALS
BODY MASS INDEX: 31.07 KG/M2 | WEIGHT: 182 LBS | RESPIRATION RATE: 18 BRPM | SYSTOLIC BLOOD PRESSURE: 110 MMHG | OXYGEN SATURATION: 98 % | HEART RATE: 90 BPM | HEIGHT: 64 IN | DIASTOLIC BLOOD PRESSURE: 74 MMHG | TEMPERATURE: 97.3 F

## 2021-08-05 PROCEDURE — 59025 FETAL NON-STRESS TEST: CPT

## 2021-08-05 PROCEDURE — 302449 STATCHG TRIAGE ONLY (STATISTIC)

## 2021-08-05 NOTE — ED PROVIDER NOTES
OB H&P:    CC: Contractions    HPI:  Ms. Araceli Vale is a 26 y.o.  @ [id number] by US with occasional contractions at home. The patient denies any other complaints. She reports no ROM or vaginal bleeding. She denies any chest pain, shortness of breath, headache, vision changes, nausea, vomiting, numbness or tingling.     Contractions: Yes   Loss of fluid: No   Vaginal bleeding: No   Fetal movement: present      PNC with [facility]    PNL:  Rh+/-, RI, HIV neg, TrepAb neg, HBsAg NR, GC/CT neg/neg  Glucola: 108 mg/dL GTT at 1 hour in 3rd trimester  GBS unknown      ROS:  Const: denies fevers, general concerns  CV/resp: reports no concerns  GI: denies abd pain, GI concerns  : see HPI  Neuro: denies HA/vision changes    OB History    Para Term  AB Living   5 3 2 1 1 2   SAB TAB Ectopic Molar Multiple Live Births   1     0   3      # Outcome Date GA Lbr Geovanny/2nd Weight Sex Delivery Anes PTL Lv   5 Current            4 Term 19 39w2d  2.59 kg (5 lb 11.4 oz) F Vag-Spont OTHER N KELLY   3  17 22w0d  0.385 kg (13.6 oz) F    ND      Birth Comments: D&C needed. partial molar pregnancy   2 Term 12/05/15 40w0d  2.3 kg (5 lb 1.1 oz) F Vag-Spont None N KELLY      Birth Comments: Denies any complications   1 SAB 2014 8w0d             Birth Comments: passed on its own       GYN: denies STIs, no cervical procedures    Past Medical History:   Diagnosis Date   • Urinary tract infection        Past Surgical History:   Procedure Laterality Date   • DILATION AND CURETTAGE  3/17/2017    Procedure: DILATION AND CURETTAGE;  Surgeon: Vic Soler M.D.;  Location: LABOR AND DELIVERY;  Service:        No current facility-administered medications on file prior to encounter.     Current Outpatient Medications on File Prior to Encounter   Medication Sig Dispense Refill   • Prenatal Multivit-Min-Fe-FA (PRENATAL VITAMINS PO) Take  by mouth.         Family History   Problem Relation Age of Onset   • No Known Problems  "Mother    • No Known Problems Father    • No Known Problems Sister    • No Known Problems Brother    • No Known Problems Maternal Grandmother    • No Known Problems Maternal Grandfather    • No Known Problems Sister        Social History     Tobacco Use   • Smoking status: Never Smoker   • Smokeless tobacco: Never Used   Vaping Use   • Vaping Use: Never used   Substance Use Topics   • Alcohol use: No   • Drug use: No         PE:  Vitals:    21 0918   BP: 110/74   Pulse: 90   Resp: 18   Temp: 36.3 °C (97.3 °F)   TempSrc: Temporal   SpO2: 98%   Weight: 82.6 kg (182 lb)   Height: 1.626 m (5' 4\")     gen: AAO, NAD  abd: soft, gravid, NT  Ext: NT, no edema    SVE: closed/thick/high station @ 0930  NST nonconcerning  Jordana: external tocometry with occasional contraction    A/P: 26 y.o.  @ 38w4d by LMP with US     -Few nonpainful contractions while in triage   -No ROM   -NST reactive    -No vaginal bleeding   -Sterile cervical exam closed/thick/high station   -/74 and afebrile    Russ Lawrence M.D.        "

## 2021-08-05 NOTE — PROGRESS NOTES
" EDC 8/15- 38     Pt presents to L&D with c/o irregular contractions. Pt states she is having contractions \"sometimes, but not consistently.\" Pt reports good fetal movement, denies vaginal bleeding and leaking of fluid. SVE closed/thick/high.  0930- report to Dr Lawrence, order received to discharge pt after reactive NST. MD in room to see pt  1000- discharge teaching done with pt, pt verbalized understanding.   "

## 2021-08-06 ENCOUNTER — HOSPITAL ENCOUNTER (INPATIENT)
Facility: MEDICAL CENTER | Age: 26
LOS: 2 days | End: 2021-08-08
Attending: OBSTETRICS & GYNECOLOGY | Admitting: OBSTETRICS & GYNECOLOGY
Payer: MEDICAID

## 2021-08-06 LAB
BASOPHILS # BLD AUTO: 0.2 % (ref 0–1.8)
BASOPHILS # BLD: 0.02 K/UL (ref 0–0.12)
EOSINOPHIL # BLD AUTO: 0.03 K/UL (ref 0–0.51)
EOSINOPHIL NFR BLD: 0.3 % (ref 0–6.9)
ERYTHROCYTE [DISTWIDTH] IN BLOOD BY AUTOMATED COUNT: 43.2 FL (ref 35.9–50)
ERYTHROCYTE [DISTWIDTH] IN BLOOD BY AUTOMATED COUNT: 44.6 FL (ref 35.9–50)
HCT VFR BLD AUTO: 39.2 % (ref 37–47)
HCT VFR BLD AUTO: 41.5 % (ref 37–47)
HGB BLD-MCNC: 12.7 G/DL (ref 12–16)
HGB BLD-MCNC: 13.9 G/DL (ref 12–16)
HOLDING TUBE BB 8507: NORMAL
IMM GRANULOCYTES # BLD AUTO: 0.06 K/UL (ref 0–0.11)
IMM GRANULOCYTES NFR BLD AUTO: 0.6 % (ref 0–0.9)
LYMPHOCYTES # BLD AUTO: 1.84 K/UL (ref 1–4.8)
LYMPHOCYTES NFR BLD: 18.7 % (ref 22–41)
MCH RBC QN AUTO: 25.8 PG (ref 27–33)
MCH RBC QN AUTO: 25.9 PG (ref 27–33)
MCHC RBC AUTO-ENTMCNC: 32.4 G/DL (ref 33.6–35)
MCHC RBC AUTO-ENTMCNC: 33.5 G/DL (ref 33.6–35)
MCV RBC AUTO: 77.4 FL (ref 81.4–97.8)
MCV RBC AUTO: 79.7 FL (ref 81.4–97.8)
MONOCYTES # BLD AUTO: 0.48 K/UL (ref 0–0.85)
MONOCYTES NFR BLD AUTO: 4.9 % (ref 0–13.4)
NEUTROPHILS # BLD AUTO: 7.4 K/UL (ref 2–7.15)
NEUTROPHILS NFR BLD: 75.3 % (ref 44–72)
NRBC # BLD AUTO: 0 K/UL
NRBC BLD-RTO: 0 /100 WBC
PLATELET # BLD AUTO: 234 K/UL (ref 164–446)
PLATELET # BLD AUTO: 268 K/UL (ref 164–446)
PMV BLD AUTO: 9.8 FL (ref 9–12.9)
PMV BLD AUTO: 9.9 FL (ref 9–12.9)
RBC # BLD AUTO: 4.92 M/UL (ref 4.2–5.4)
RBC # BLD AUTO: 5.36 M/UL (ref 4.2–5.4)
SARS-COV+SARS-COV-2 AG RESP QL IA.RAPID: NOTDETECTED
SARS-COV-2 RNA RESP QL NAA+PROBE: NOTDETECTED
SPECIMEN SOURCE: NORMAL
SPECIMEN SOURCE: NORMAL
WBC # BLD AUTO: 13.3 K/UL (ref 4.8–10.8)
WBC # BLD AUTO: 9.8 K/UL (ref 4.8–10.8)

## 2021-08-06 PROCEDURE — U0003 INFECTIOUS AGENT DETECTION BY NUCLEIC ACID (DNA OR RNA); SEVERE ACUTE RESPIRATORY SYNDROME CORONAVIRUS 2 (SARS-COV-2) (CORONAVIRUS DISEASE [COVID-19]), AMPLIFIED PROBE TECHNIQUE, MAKING USE OF HIGH THROUGHPUT TECHNOLOGIES AS DESCRIBED BY CMS-2020-01-R: HCPCS

## 2021-08-06 PROCEDURE — 700111 HCHG RX REV CODE 636 W/ 250 OVERRIDE (IP)

## 2021-08-06 PROCEDURE — 59414 DELIVER PLACENTA: CPT | Performed by: OBSTETRICS & GYNECOLOGY

## 2021-08-06 PROCEDURE — 99284 EMERGENCY DEPT VISIT MOD MDM: CPT

## 2021-08-06 PROCEDURE — 36415 COLL VENOUS BLD VENIPUNCTURE: CPT

## 2021-08-06 PROCEDURE — 700105 HCHG RX REV CODE 258

## 2021-08-06 PROCEDURE — 85027 COMPLETE CBC AUTOMATED: CPT

## 2021-08-06 PROCEDURE — 85025 COMPLETE CBC W/AUTO DIFF WBC: CPT

## 2021-08-06 PROCEDURE — U0005 INFEC AGEN DETEC AMPLI PROBE: HCPCS

## 2021-08-06 PROCEDURE — 770002 HCHG ROOM/CARE - OB PRIVATE (112)

## 2021-08-06 PROCEDURE — 59409 OBSTETRICAL CARE: CPT

## 2021-08-06 PROCEDURE — 87426 SARSCOV CORONAVIRUS AG IA: CPT

## 2021-08-06 PROCEDURE — 304965 HCHG RECOVERY SERVICES

## 2021-08-06 RX ORDER — SODIUM CHLORIDE, SODIUM LACTATE, POTASSIUM CHLORIDE, CALCIUM CHLORIDE 600; 310; 30; 20 MG/100ML; MG/100ML; MG/100ML; MG/100ML
INJECTION, SOLUTION INTRAVENOUS CONTINUOUS
Status: ACTIVE | OUTPATIENT
Start: 2021-08-06 | End: 2021-08-06

## 2021-08-06 RX ORDER — DOCUSATE SODIUM 100 MG/1
100 CAPSULE, LIQUID FILLED ORAL 2 TIMES DAILY PRN
Status: DISCONTINUED | OUTPATIENT
Start: 2021-08-06 | End: 2021-08-08 | Stop reason: HOSPADM

## 2021-08-06 RX ORDER — ACETAMINOPHEN 500 MG
1000 TABLET ORAL EVERY 6 HOURS
Status: DISCONTINUED | OUTPATIENT
Start: 2021-08-06 | End: 2021-08-07

## 2021-08-06 RX ORDER — ONDANSETRON 4 MG/1
4 TABLET, ORALLY DISINTEGRATING ORAL EVERY 6 HOURS PRN
Status: DISCONTINUED | OUTPATIENT
Start: 2021-08-06 | End: 2021-08-08 | Stop reason: HOSPADM

## 2021-08-06 RX ORDER — ONDANSETRON 2 MG/ML
4 INJECTION INTRAMUSCULAR; INTRAVENOUS EVERY 6 HOURS PRN
Status: DISCONTINUED | OUTPATIENT
Start: 2021-08-06 | End: 2021-08-08 | Stop reason: HOSPADM

## 2021-08-06 RX ORDER — IBUPROFEN 600 MG/1
600 TABLET ORAL EVERY 6 HOURS PRN
Status: DISCONTINUED | OUTPATIENT
Start: 2021-08-06 | End: 2021-08-08 | Stop reason: HOSPADM

## 2021-08-06 RX ORDER — SODIUM CHLORIDE, SODIUM LACTATE, POTASSIUM CHLORIDE, CALCIUM CHLORIDE 600; 310; 30; 20 MG/100ML; MG/100ML; MG/100ML; MG/100ML
INJECTION, SOLUTION INTRAVENOUS PRN
Status: DISCONTINUED | OUTPATIENT
Start: 2021-08-06 | End: 2021-08-08 | Stop reason: HOSPADM

## 2021-08-06 RX ORDER — ALUMINA, MAGNESIA, AND SIMETHICONE 2400; 2400; 240 MG/30ML; MG/30ML; MG/30ML
30 SUSPENSION ORAL EVERY 6 HOURS PRN
Status: DISCONTINUED | OUTPATIENT
Start: 2021-08-06 | End: 2021-08-06 | Stop reason: HOSPADM

## 2021-08-06 RX ADMIN — SODIUM CHLORIDE, POTASSIUM CHLORIDE, SODIUM LACTATE AND CALCIUM CHLORIDE: 600; 310; 30; 20 INJECTION, SOLUTION INTRAVENOUS at 11:15

## 2021-08-06 RX ADMIN — OXYTOCIN 350 ML/HR: 10 INJECTION, SOLUTION INTRAMUSCULAR; INTRAVENOUS at 13:04

## 2021-08-06 RX ADMIN — FENTANYL CITRATE 100 MCG: 50 INJECTION INTRAMUSCULAR; INTRAVENOUS at 11:08

## 2021-08-06 RX ADMIN — OXYTOCIN 2000 ML/HR: 10 INJECTION, SOLUTION INTRAMUSCULAR; INTRAVENOUS at 11:40

## 2021-08-06 RX ADMIN — SODIUM CHLORIDE 5 MILLION UNITS: 900 INJECTION INTRAVENOUS at 11:07

## 2021-08-06 ASSESSMENT — LIFESTYLE VARIABLES
EVER_SMOKED: NEVER
EVER FELT BAD OR GUILTY ABOUT YOUR DRINKING: NO
TOTAL SCORE: 0
EVER_SMOKED: NEVER
TOTAL SCORE: 0
EVER HAD A DRINK FIRST THING IN THE MORNING TO STEADY YOUR NERVES TO GET RID OF A HANGOVER: NO
HAVE YOU EVER FELT YOU SHOULD CUT DOWN ON YOUR DRINKING: NO
TOTAL SCORE: 0
HAVE PEOPLE ANNOYED YOU BY CRITICIZING YOUR DRINKING: NO
ALCOHOL_USE: NO
CONSUMPTION TOTAL: INCOMPLETE

## 2021-08-06 ASSESSMENT — EDINBURGH POSTNATAL DEPRESSION SCALE (EPDS)
I HAVE BEEN SO UNHAPPY THAT I HAVE BEEN CRYING: NO, NEVER
THE THOUGHT OF HARMING MYSELF HAS OCCURRED TO ME: NEVER
I HAVE FELT SAD OR MISERABLE: NO, NOT AT ALL
THINGS HAVE BEEN GETTING ON TOP OF ME: NO, MOST OF THE TIME I HAVE COPED QUITE WELL
I HAVE BLAMED MYSELF UNNECESSARILY WHEN THINGS WENT WRONG: NO, NEVER
I HAVE BEEN ABLE TO LAUGH AND SEE THE FUNNY SIDE OF THINGS: AS MUCH AS I ALWAYS COULD
I HAVE FELT SCARED OR PANICKY FOR NO GOOD REASON: NO, NOT AT ALL
I HAVE BEEN SO UNHAPPY THAT I HAVE HAD DIFFICULTY SLEEPING: NOT AT ALL
I HAVE BEEN ANXIOUS OR WORRIED FOR NO GOOD REASON: NO, NOT AT ALL
I HAVE LOOKED FORWARD WITH ENJOYMENT TO THINGS: AS MUCH AS I EVER DID

## 2021-08-06 ASSESSMENT — COPD QUESTIONNAIRES
DO YOU EVER COUGH UP ANY MUCUS OR PHLEGM?: NO/ONLY WITH OCCASIONAL COLDS OR INFECTIONS
COPD SCREENING SCORE: 0
IN THE PAST 12 MONTHS DO YOU DO LESS THAN YOU USED TO BECAUSE OF YOUR BREATHING PROBLEMS: DISAGREE/UNSURE
DURING THE PAST 4 WEEKS HOW MUCH DID YOU FEEL SHORT OF BREATH: NONE/LITTLE OF THE TIME
HAVE YOU SMOKED AT LEAST 100 CIGARETTES IN YOUR ENTIRE LIFE: NO/DON'T KNOW

## 2021-08-06 ASSESSMENT — PAIN DESCRIPTION - PAIN TYPE: TYPE: ACUTE PAIN

## 2021-08-06 ASSESSMENT — PATIENT HEALTH QUESTIONNAIRE - PHQ9
SUM OF ALL RESPONSES TO PHQ9 QUESTIONS 1 AND 2: 0
2. FEELING DOWN, DEPRESSED, IRRITABLE, OR HOPELESS: NOT AT ALL
1. LITTLE INTEREST OR PLEASURE IN DOING THINGS: NOT AT ALL

## 2021-08-06 NOTE — PROGRESS NOTES
1500 Assessment completed, fundus firm, lochia light,  POC reviewed, verbalized understanding. Denies pain at this time, will call if pain med intervention needed.

## 2021-08-06 NOTE — CARE PLAN
Problem: Knowledge Deficit - Postpartum  Goal: Patient will verbalize and demonstrate understanding of self and infant care  Outcome: Progressing     Problem: Psychosocial - Postpartum  Goal: Patient will verbalize and demonstrate effective bonding and parenting behavior  Outcome: Progressing     Problem: Altered Physiologic Condition  Goal: Patient physiologically stable as evidenced by normal lochia, palpable uterine involution and vitals within normal limits  Outcome: Progressing     Problem: Infection - Postpartum  Goal: Postpartum patient will be free of signs and symptoms of infection  Outcome: Progressing   The patient is Stable - Low risk of patient condition declining or worsening    Shift Goals  Clinical Goals:  (Acceptable pain level)    Progress made toward(s) clinical / shift goals:  MOB verbalized acceptable pain level    Patient is not progressing towards the following goals:

## 2021-08-06 NOTE — PROGRESS NOTES
26 y.o.  EDC 8/15 (38.5 wks)     Pt presents to L&D c/o painful, regular UCs. Reports +FM and denies LOF/VB. SVE -/-2. Dr Lara notified. Admit order received. Report given to Nya NARAYANAN.

## 2021-08-06 NOTE — PROGRESS NOTES
1040- Report received from LADY Corona. Patient ambulated with FOB to S223. POC discussed, questions addressed, all needs met at this time.     1133- SROM, clear fluid.  of female infant by LADY Fay. Apgar 8/9. MD to bedside immediately following delivery. Placenta delivered at 1137.

## 2021-08-06 NOTE — H&P
OB H&P:    CC: Contractions    HPI:  Ms. Araceli Vale is a 26 y.o.  @ 38w5d by US with contractions, in active labor. Upon arrival to the hospital the patient notes that she has been experiencing consistent contractions. She denies any LOF but states she did note a small amount of blood in the usual vaginal discharge she has been experiencing in the mornings today. She denies any chest pain, shortness of breath, fever, chills, headache, nausea, vomiting, numbness, tingling or any other complaints. She states that she is GBS + but denies any complications during her pregnancy.     Contractions: Yes   Loss of fluid: No   Vaginal bleeding: Yes   Fetal movement: present      PNC with [facility]    PNL:  Rh+/-, RI, HIV neg, TrepAb neg, HBsAg NR, GC/CT neg/neg  Glucola: 108 mg/dL GTT at 1 hour in 3rd trimester  GBS + per mother      ROS:  Const: denies fevers, general concerns  CV/resp: reports no concerns  GI: denies abd pain, GI concerns  : see HPI  Neuro: denies HA/vision changes    OB History    Para Term  AB Living   5 3 2 1 1 2   SAB TAB Ectopic Molar Multiple Live Births   1     0   3      # Outcome Date GA Lbr Geovanny/2nd Weight Sex Delivery Anes PTL Lv   5 Current            4 Term 19 39w2d  2.59 kg (5 lb 11.4 oz) F Vag-Spont OTHER N KELLY   3  17 22w0d  0.385 kg (13.6 oz) F    ND      Birth Comments: D&C needed. partial molar pregnancy   2 Term 12/05/15 40w0d  2.3 kg (5 lb 1.1 oz) F Vag-Spont None N KELLY      Birth Comments: Denies any complications   1 SAB 2014 8w0d             Birth Comments: passed on its own       GYN: denies STIs, no cervical procedures    Past Medical History:   Diagnosis Date   • Urinary tract infection        Past Surgical History:   Procedure Laterality Date   • DILATION AND CURETTAGE  3/17/2017    Procedure: DILATION AND CURETTAGE;  Surgeon: Vic Soler M.D.;  Location: LABOR AND DELIVERY;  Service:        No current facility-administered medications on  "file prior to encounter.     Current Outpatient Medications on File Prior to Encounter   Medication Sig Dispense Refill   • Prenatal Multivit-Min-Fe-FA (PRENATAL VITAMINS PO) Take  by mouth.         Family History   Problem Relation Age of Onset   • No Known Problems Mother    • No Known Problems Father    • No Known Problems Sister    • No Known Problems Brother    • No Known Problems Maternal Grandmother    • No Known Problems Maternal Grandfather    • No Known Problems Sister        Social History     Tobacco Use   • Smoking status: Never Smoker   • Smokeless tobacco: Never Used   Vaping Use   • Vaping Use: Never used   Substance Use Topics   • Alcohol use: No   • Drug use: No         PE:  Vitals:    21 1000 21 1025   BP:  121/68   Pulse:  (!) 122   Resp:  16   Temp:  36.7 °C (98 °F)   TempSrc:  Temporal   Weight: 82.6 kg (182 lb) 82.6 kg (182 lb)   Height: 1.626 m (5' 4\") 1.626 m (5' 4\")     gen: AAO, NAD  abd: soft, gravid, NT, EFW 2500 grams  Ext: NT, no edema    SVE: -/-2 @ 1015  FHT: 135/moderate variability/+ accels/ - decels  Jordana: Consistent contractions every 3-5 mins by external tocometry    A/P: 26 y.o.  @ 38w5d by US.      -Presents in active labor   -GBS + per mother    -Penicillin   -IVF   -CBC   -CEFM and tocometry      Russ Lawrence M.D.        "

## 2021-08-06 NOTE — ED PROVIDER NOTES
OB H&P:    CC: Contractions    HPI:  Ms. Araceli Vale is a 26 y.o.  @ 38w5d by US with contractions, in active labor. Upon arrival to the hospital the patient notes that she has been experiencing consistent contractions. She denies any LOF but states she did not a small amount of blood in the usual vaginal discharge she experiences in the morning. She denies any chest pain, shortness of breath, fever, chills, headache, nausea, vomiting, numbness, tingling or any other complaints. She notes that she is GBS + but denies any complications during her pregnancy.     Contractions: Yes   Loss of fluid: No   Vaginal bleeding: Yes   Fetal movement: present      PNC with [facility]    PNL:  Rh+/-, RI, HIV neg, TrepAb neg, HBsAg NR, GC/CT neg/neg  Glucola: 108 mg/dL GTT at 1 hour in 3rd trimester  GBS + per mother      ROS:  Const: denies fevers, general concerns  CV/resp: reports no concerns  GI: denies abd pain, GI concerns  : see HPI  Neuro: denies HA/vision changes    OB History    Para Term  AB Living   5 3 2 1 1 2   SAB TAB Ectopic Molar Multiple Live Births   1     0   3      # Outcome Date GA Lbr Geovanny/2nd Weight Sex Delivery Anes PTL Lv   5 Current            4 Term 19 39w2d  2.59 kg (5 lb 11.4 oz) F Vag-Spont OTHER N KELLY   3  17 22w0d  0.385 kg (13.6 oz) F    ND      Birth Comments: D&C needed. partial molar pregnancy   2 Term 12/05/15 40w0d  2.3 kg (5 lb 1.1 oz) F Vag-Spont None N KELLY      Birth Comments: Denies any complications   1 SAB 2014 8w0d             Birth Comments: passed on its own       GYN: denies STIs, no cervical procedures    Past Medical History:   Diagnosis Date   • Urinary tract infection        Past Surgical History:   Procedure Laterality Date   • DILATION AND CURETTAGE  3/17/2017    Procedure: DILATION AND CURETTAGE;  Surgeon: Vic Soler M.D.;  Location: LABOR AND DELIVERY;  Service:        No current facility-administered medications on file prior to  "encounter.     Current Outpatient Medications on File Prior to Encounter   Medication Sig Dispense Refill   • Prenatal Multivit-Min-Fe-FA (PRENATAL VITAMINS PO) Take  by mouth.         Family History   Problem Relation Age of Onset   • No Known Problems Mother    • No Known Problems Father    • No Known Problems Sister    • No Known Problems Brother    • No Known Problems Maternal Grandmother    • No Known Problems Maternal Grandfather    • No Known Problems Sister        Social History     Tobacco Use   • Smoking status: Never Smoker   • Smokeless tobacco: Never Used   Vaping Use   • Vaping Use: Never used   Substance Use Topics   • Alcohol use: No   • Drug use: No         PE:  Vitals:    21 1000   Weight: 82.6 kg (182 lb)   Height: 1.626 m (5' 4\")     gen: AAO, NAD  abd: soft, gravid, NT  Ext: NT, no edema    SVE: 5-6/90/-2 @ 1015  FHT: 135/moderate variability/+ accels/ - decels  Jordana:  Consistent contractions every 3-5 by external tocometry    A/P: 26 y.o.  @ 38w5d by LMP with US.       -Presents in active labor   -GBS +    -Penicillin   -Expectant management   -IVF   -CBC   -CEFM and tocometry    Russ Lawrence M.D.        "

## 2021-08-06 NOTE — L&D DELIVERY NOTE
Patient was admitted to Labor and Delivery at 38w5d for active labor.  Patient was GBS +, and received 1 dose of IV PCN, hanging for approximately 30 minutes.    I was called to the room after spontaneous precipitous delivery in bed.  Upon my arrival, the infant was on the maternal abdomen.  The cord was clamped and cut, and the infant was brought to the warmer by the nurse for further care.   20 units of Pitocin in 1 L of LR was bolused IV.  The placenta delivered spontaneously, and was noted to be three-vessel, and intact.  The perineum and cervix were inspected, and noted to be intact.  There were no lacerations.  Estimated blood loss was 50 cc.  The infant was a viable female infant with Apgars of 8 and 9, weight pending.  Mother and infant are stable in room.

## 2021-08-07 PROCEDURE — 700102 HCHG RX REV CODE 250 W/ 637 OVERRIDE(OP)

## 2021-08-07 PROCEDURE — 770002 HCHG ROOM/CARE - OB PRIVATE (112)

## 2021-08-07 PROCEDURE — A9270 NON-COVERED ITEM OR SERVICE: HCPCS

## 2021-08-07 RX ORDER — ACETAMINOPHEN 500 MG
1000 TABLET ORAL EVERY 6 HOURS PRN
Status: DISCONTINUED | OUTPATIENT
Start: 2021-08-07 | End: 2021-08-08 | Stop reason: HOSPADM

## 2021-08-07 RX ADMIN — ACETAMINOPHEN 1000 MG: 500 TABLET, FILM COATED ORAL at 18:20

## 2021-08-07 RX ADMIN — IBUPROFEN 600 MG: 600 TABLET ORAL at 20:23

## 2021-08-07 RX ADMIN — ACETAMINOPHEN 1000 MG: 500 TABLET, FILM COATED ORAL at 07:02

## 2021-08-07 NOTE — CARE PLAN
The patient is Stable - Low risk of patient condition declining or worsening    Shift Goals  Clinical Goals: maintain tolerable pain level and lochia flow    Progress made toward(s) clinical / shift goals:  pt has maintained stable light bleeding and a tolerable pain level throughout the night. Pt aware of pain medication available to her but has declined throughout the night.     Patient is not progressing towards the following goals:

## 2021-08-07 NOTE — PROGRESS NOTES
Obstetrics & Gynecology Post-Delivery Progress Note    Date of Service      26 y.o.  s/p vaginal, spontaneous  Delivery date: 21      Subjective  Pt reports that she is doing well and has not been taking any pain medication.   Pain: No  Bleeding: lochia minimal  Tolerating PO: yes  Voiding: without difficulty  Ambulating: yes  Passing flatus: Yes  Feeding: breastfeeding well but would like to talk with lactation      Objective  24hr VS:  Temp:  [35.8 °C (96.5 °F)-36.9 °C (98.5 °F)] 36.9 °C (98.4 °F)  Pulse:  [] 69  Resp:  [16-20] 18  BP: (116-121)/(68-77) 118/71  SpO2:  [96 %-98 %] 98 %    Physical Exam  Gen: well  CV: RRR, no murmur   Resp: unlabored respirations, no intercostal retractions or accessory muscle use, clear to auscultation without rales or wheezes  Abd: nontender, soft, non-distended  Fundus: firm and at umbilicus  Incision: not applicable, (vaginal delivery)  Ext: symmetric and no edema, calves nontender    Labs:  Recent Labs     21  1117 21   WBC 9.8 13.3*   RBC 5.36 4.92   HEMOGLOBIN 13.9 12.7   HEMATOCRIT 41.5 39.2   MCV 77.4* 79.7*   MCH 25.9* 25.8*   RDW 43.2 44.6   PLATELETCT 268 234   MPV 9.8 9.9   NEUTSPOLYS 75.30*  --    LYMPHOCYTES 18.70*  --    MONOCYTES 4.90  --    EOSINOPHILS 0.30  --    BASOPHILS 0.20  --          Medications     PRN medications: acetaminophen, ondansetron **OR** ondansetron, ibuprofen, LR, docusate sodium, magnesium hydroxide      Assessment/Plan  Araceli Vale is a 26 y.o.yo  postpartum day #1  s/p vaginal, spontaneous    - Post care: meeting all goals  - bleeding minimal  - Pain: controlled  - Rh+, Rubella Immune  - Method of Feeding: plans to breastfeed, would like to speak with lactation  - Method of Contraception: will discuss at follow up visit  VTE prophylaxis: none indicated    - Disposition: likely home PPD#2, hx GBS +     Russ Lawrence M.D.

## 2021-08-07 NOTE — PROGRESS NOTES
Report received from Harrison NARAYANAN. Pt assessment complete. Reviewed POC for this evening. Pt has no questions at this time. Will remove IV later tonight. Call light is within reach. Advised pt to call for assistance at any time. Will continue postpartum care.

## 2021-08-08 ENCOUNTER — PHARMACY VISIT (OUTPATIENT)
Dept: PHARMACY | Facility: MEDICAL CENTER | Age: 26
End: 2021-08-08
Payer: COMMERCIAL

## 2021-08-08 VITALS
RESPIRATION RATE: 16 BRPM | HEIGHT: 64 IN | HEART RATE: 64 BPM | DIASTOLIC BLOOD PRESSURE: 76 MMHG | TEMPERATURE: 97.1 F | WEIGHT: 182 LBS | BODY MASS INDEX: 31.07 KG/M2 | OXYGEN SATURATION: 96 % | SYSTOLIC BLOOD PRESSURE: 113 MMHG

## 2021-08-08 PROCEDURE — A9270 NON-COVERED ITEM OR SERVICE: HCPCS

## 2021-08-08 PROCEDURE — RXMED WILLOW AMBULATORY MEDICATION CHARGE

## 2021-08-08 PROCEDURE — 700102 HCHG RX REV CODE 250 W/ 637 OVERRIDE(OP)

## 2021-08-08 RX ORDER — ACETAMINOPHEN 500 MG
1000 TABLET ORAL EVERY 6 HOURS PRN
Qty: 30 TABLET | Refills: 0 | Status: SHIPPED | OUTPATIENT
Start: 2021-08-08 | End: 2021-09-16

## 2021-08-08 RX ORDER — PSEUDOEPHEDRINE HCL 30 MG
100 TABLET ORAL 2 TIMES DAILY PRN
Qty: 60 CAPSULE | Refills: 0 | Status: SHIPPED | OUTPATIENT
Start: 2021-08-08 | End: 2021-09-16

## 2021-08-08 RX ORDER — IBUPROFEN 600 MG/1
600 TABLET ORAL EVERY 6 HOURS PRN
Qty: 30 TABLET | Refills: 0 | Status: SHIPPED | OUTPATIENT
Start: 2021-08-08 | End: 2021-09-16

## 2021-08-08 RX ADMIN — IBUPROFEN 600 MG: 600 TABLET ORAL at 05:36

## 2021-08-08 NOTE — DISCHARGE PLANNING
Meds-to-Beds: Discharge prescription orders listed below delivered to patient's bedside. Preeti RN notified.  Patient denies . Will call pharmacy with any questions.     Araceli Vale   Home Medication Instructions JESSICA:[id number]    Printed on:08/08/21 1257   Medication Information                      acetaminophen (TYLENOL) 500 MG Tab  Take 2 Tablets by mouth every 6 hours as needed.             docusate sodium 100 MG Cap  Take 1 capsule by mouth 2 times a day as needed for Constipation.             ibuprofen (MOTRIN) 600 MG Tab  Take 1 tablet by mouth every 6 hours as needed (For cramping after delivery)                 Rosa Elena Nunez, PharmD

## 2021-08-08 NOTE — PROGRESS NOTES
Discharge education done, questions answered, and papers signed. Patient was discharged in wheelchair.

## 2021-08-08 NOTE — CARE PLAN
The patient is Stable - Low risk of patient condition declining or worsening    Shift Goals  Clinical Goals: clinically stable    Progress made toward(s) clinical / shift goals:  patient is clinically stable    Patient is not progressing towards the following goals:

## 2021-08-08 NOTE — DISCHARGE INSTRUCTIONS
PATIENT DISCHARGE EDUCATION INSTRUCTION SHEET  REASONS TO CALL YOUR OBSTETRICIAN  · Persistent fever, shaking, chills (Temperature higher than 100.4) may indicate you have an infection  · Heavy bleeding: soaking more than 1 pad per hour; Passing clots an egg-sized clot or bigger may mean you have an postpartum hemorrhage  · Foul odor from vagina or bad smelling or discolored discharge or blood  · Breast infection (Mastitis symptoms); breast pain, chills, fever, redness or red streaks, may feel flu like symptoms  · Urinary pain, burning or frequency  · Incision that is not healing, increased redness, swelling, tenderness or pain, or any pus from episiotomy or  site may mean you have an infection  · Redness, swelling, warmth, or painful to touch in the calf area of your leg may mean you have a blood clot  · Severe or intensified depression, thoughts or feelings of wanting to hurt yourself or someone else   · Pain in chest, obstructed breathing or shortness of breath (trouble catching your breath) may mean you are having a postpartum complication. Call your provider immediately   · Headache that does not get better, even after taking medicine, a bad headache with vision changes or pain in the upper right area of your belly may mean you have high blood pressure or post birth preeclampsia. Call your provider immediately    HAND WASHING  All family and friends should wash their hands:  · Before and after holding the baby  · Before feeding the baby  · After using the restroom or changing the baby's diaper    WOUND CARE  Ask your physician for additional care instructions. In general:  ·  Incision:  · May shower and pat incision dry   · Keep the incision clean and dry  · There should not be any opening or pus from the incision  · Continue to walk at home 3 times a day   · Do NOT lift anything heavier than your baby (over 10 pounds)  · Encourage family to help participate in care of the  to allow  rest and mom time to heal  · Episiotomy/Laceration  · May use sharan-spray bottle, witch hazel pads and dermaplast spray for comfort  · Use sharan-spray bottle after urinating to cleanse perineal area  · To prevent burning during urination spray sharan-water bottle on labial area   · Pat perineal area dry until episiotomy/laceration is healed  · Continue to use sharan-bottle until bleeding stops as needed  · If have a 2nd degree laceration or greater, a Sitz bath can offer relief from soreness, burning, and inflammation   · Sitz Bath   · Sit in 6 inches of warm water and soak laceration as needed until the laceration heals    VAGINAL CARE AND BLEEDING  · Nothing inside vagina for 6 weeks:   · No sexual intercourse, tampons or douching  · Bleeding may continue for 2-4 weeks. Amount and color may vary  · Soaking 1 pad or more in an hour for several hours is considered heavy bleeding  · Passing large egg sized blood clots can be concerning  · If you feel like you have heavy bleeding or are having increasing amount of blood clots call your Obstetrician immediately  · If you begin feeling faint upon standing, feeling sick to your stomach, have clammy skin, a really fast heartbeat, have chills, start feeling confused, dizzy, sleepy or weak, or feeling like you're going to faint call your Obstetrician immediately    HYPERTENSION   Preeclampsia or gestational hypertension are types of high blood pressure that only pregnant women can get. It is important for you to be aware of symptoms to seek early intervention and treatment. If you have any of these symptoms immediately call your Obstetrician    · Vision changes or blurred vision   · Severe headache or pain that is unrelieved with medication and will not go away  · Persistent pain in upper abdomen or shoulder   · Increased swelling of face, feet, or hands  · Difficulty breathing or shortness of breath at rest  · Urinating less than usual    URINATION AND BOWEL MOVEMENTS  · Eating  "more fiber (bran cereal, fruits, and vegetables) and drinking plenty of fluids will help to avoid constipation  · Urinary frequency and urgency after childbirth is normal  · If you experience any urinary pain, burning or frequency call your provider    BIRTH CONTROL  · It is possible to become pregnant at any time after delivery and while breastfeeding  · Plan to discuss a method of birth control with your physician at your post delivery follow up visit    POSTPARTUM BLUES  During the first few days after birth, you may experience a sense of the \"blues\" which may include impatience, irritability or even crying. These feelings come and go quickly. However, as many as 1 in 10 women experience emotional symptoms known as postpartum depression.     POSTPARTUM DEPRESSION    May start as early as the second or third day after delivery or take several weeks or months to develop. Symptoms of \"blues\" are present, but are more intense: Crying spells; loss of appetite; feelings of hopelessness or loss of control; fear of touching the baby; over concern or no concern at all about the baby; little or no concern about your own appearance/caring for yourself; and/or inability to sleep or excessive sleeping. Contact your Obstetrician if you are experiencing any of these symptoms     PREVENTING SHAKEN BABY  If you are angry or stressed, PUT THE BABY IN THE CRIB, step away, take some deep breaths, and wait until you are calm to care for the baby. DO NOT SHAKE THE BABY. You are not alone, call a supporter for help.  · [facility] 24/7 crisis call line (737-814-4757) or (1-750.119.8127)  · You can also text them, text \"ANSWER\" (592866)      "

## 2021-08-08 NOTE — LACTATION NOTE
This note was copied from a baby's chart.  @9225 LC met with ASHLEY, ASHLEY states she has been both breast and formula feeding, baby was 38.5 weeks gestation at delivery, birth weight was 5# 9.1 oz, baby's current weight loss is 7.41%, baby has voided and stooled, MOB reports she is concerned that baby wants to formula feed more than breastfeed, education provided on breastfeeding and supplementation, education provided on potential for sub-optimal breastfeeding/baby becoming tired at the breast due to baby's small size    MOB states she has a breast pump for home use, encouraged pump use Q 3 hours for 15 minutes to help drive her milk supply    Encouraged Q 3 hour breastfeeding followed by supplementation per guidelines provided    Supplement guidelines provided and explained    MOB has [place] [employer], instructed to call her WIC counselor for breastfeeding assistance as needed after discharge    Zoom meeting information provided    [facility] Breastfeeding Resources information sheet provided    MOB denies having any additional questions or concerns for LC at this time    Encouraged to call for assistance as needed

## 2021-08-08 NOTE — DISCHARGE SUMMARY
Discharge Summary:     Date of Admission: 2021  Date of Discharge: 21      Admitting diagnosis:    1. Pregnancy @ 38w5d      Discharge Diagnosis:   1. Status post vaginal, spontaneous.    Past Medical History:   Diagnosis Date   • Urinary tract infection      OB History    Para Term  AB Living   5 4 3 1 1 3   SAB TAB Ectopic Molar Multiple Live Births   1     0 0 4      # Outcome Date GA Lbr Geovanny/2nd Weight Sex Delivery Anes PTL Lv   5 Term 21 38w5d  2.525 kg (5 lb 9.1 oz) F Vag-Spont None N KELLY   4 Term 19 39w2d  2.59 kg (5 lb 11.4 oz) F Vag-Spont OTHER N KELLY   3  17 22w0d  0.385 kg (13.6 oz) F    ND      Birth Comments: D&C needed. partial molar pregnancy   2 Term 12/05/15 40w0d  2.3 kg (5 lb 1.1 oz) F Vag-Spont None N KELLY      Birth Comments: Denies any complications   1 SAB 2014 8w0d             Birth Comments: passed on its own     Past Surgical History:   Procedure Laterality Date   • DILATION AND CURETTAGE  3/17/2017    Procedure: DILATION AND CURETTAGE;  Surgeon: Vic Soler M.D.;  Location: LABOR AND DELIVERY;  Service:      Patient has no known allergies.    Patient Active Problem List   Diagnosis   • History of molar pregnancy   • Supervision of other normal pregnancy, antepartum   • Breastfeeding during pregnancy   • GBS (group B Streptococcus carrier), +RV culture, currently pregnant       Hospital Course:   26 y.o. now , was admitted for contractions, underwent Active Labor, vaginal, spontaneous. Pt gave birth to baby girl with APGARs of 8/9 and weight 2.525kg.  Patient's postpartum course was unremarkable, with progressive advancement in diet , ambulation and toleration of oral analgesia. Patient without complaints today and desires discharge.  For postpartum contraception, pt desires to discuss at follow up.    Physical Exam:  Temp:  [36.2 °C (97.1 °F)-36.8 °C (98.2 °F)] 36.2 °C (97.1 °F)  Pulse:  [64-86] 64  Resp:  [16-18] 16  BP:  (113-118)/(62-76) 113/76  SpO2:  [96 %-98 %] 96 %  Physical Exam  General: well  Chest/Breasts: breastfeeding well, no complaints   Abdomen: nontender, soft, non-distended  Fundus: firm and at umbilicus  Incision: not applicable, (vaginal delivery)  Perineum: deferred  Extremities: symmetric and no edema, calves nontender    Current Facility-Administered Medications   Medication Dose   • acetaminophen (TYLENOL) tablet 1,000 mg  1,000 mg   • ondansetron (ZOFRAN ODT) dispertab 4 mg  4 mg    Or   • ondansetron (ZOFRAN) syringe/vial injection 4 mg  4 mg   • oxytocin (PITOCIN) infusion (for postpartum)   mL/hr   • ibuprofen (MOTRIN) tablet 600 mg  600 mg   • LR infusion     • docusate sodium (COLACE) capsule 100 mg  100 mg   • magnesium hydroxide (MILK OF MAGNESIA) suspension 30 mL  30 mL       Recent Labs     21  1117 21   WBC 9.8 13.3*   RBC 5.36 4.92   HEMOGLOBIN 13.9 12.7   HEMATOCRIT 41.5 39.2   MCV 77.4* 79.7*   MCH 25.9* 25.8*   MCHC 33.5* 32.4*   RDW 43.2 44.6   PLATELETCT 268 234   MPV 9.8 9.9         Activity/ Discharge Instructions::   Discharge to home  Pelvic Rest x 6 weeks  No heavy lifting x4 weeks  Call or come to ED for: heavy vaginal bleeding, fever >100.4, severe abdominal pain, severe headache, chest pain, shortness of breath,  N/V, incisional drainage, or other concerns.       Follow up:  [facility] in 5 weeks for vaginal delivery; 1 week for incision check for  delivery.     Discharge Meds:   Current Outpatient Medications   Medication Sig Dispense Refill   • acetaminophen (TYLENOL) 500 MG Tab Take 2 Tablets by mouth every 6 hours as needed. 30 tablet 0   • docusate sodium 100 MG Cap Take 100 mg by mouth 2 times a day as needed for Constipation. 60 capsule 0   • ibuprofen (MOTRIN) 600 MG Tab Take 1 tablet by mouth every 6 hours as needed (For cramping after delivery; do not give if patient is receiving ketorolac (Toradol)). 30 tablet 0       Araceli  Vale

## 2021-08-08 NOTE — PROGRESS NOTES
Bedside report recvd and pt care assumed. Pt educated on visiting hours and rules that apply.  FOB went home with the other children.  Patient educated that only the fob can stay overnight.  Pt appears upset and stated that nobody said that to her last night.  Pt assured that RN is here to help with all needs.  Will continue to monitor

## 2021-09-16 ENCOUNTER — POST PARTUM (OUTPATIENT)
Dept: OBGYN | Facility: CLINIC | Age: 26
End: 2021-09-16
Payer: MEDICAID

## 2021-09-16 VITALS — DIASTOLIC BLOOD PRESSURE: 60 MMHG | BODY MASS INDEX: 30.38 KG/M2 | SYSTOLIC BLOOD PRESSURE: 100 MMHG | WEIGHT: 177 LBS

## 2021-09-16 LAB
INT CON NEG: NEGATIVE
INT CON POS: POSITIVE
POC URINE PREGNANCY TEST: NEGATIVE

## 2021-09-16 PROCEDURE — 81025 URINE PREGNANCY TEST: CPT | Performed by: NURSE PRACTITIONER

## 2021-09-16 PROCEDURE — RXMED WILLOW AMBULATORY MEDICATION CHARGE: Performed by: NURSE PRACTITIONER

## 2021-09-16 PROCEDURE — 0503F POSTPARTUM CARE VISIT: CPT | Performed by: NURSE PRACTITIONER

## 2021-09-16 RX ORDER — ACETAMINOPHEN AND CODEINE PHOSPHATE 120; 12 MG/5ML; MG/5ML
0.35 SOLUTION ORAL DAILY
Qty: 28 TABLET | Refills: 11 | Status: SHIPPED | OUTPATIENT
Start: 2021-09-16 | End: 2021-10-19

## 2021-09-16 ASSESSMENT — EDINBURGH POSTNATAL DEPRESSION SCALE (EPDS)
THINGS HAVE BEEN GETTING ON TOP OF ME: NO, I HAVE BEEN COPING AS WELL AS EVER
I HAVE BEEN ABLE TO LAUGH AND SEE THE FUNNY SIDE OF THINGS: AS MUCH AS I ALWAYS COULD
TOTAL SCORE: 0
I HAVE FELT SAD OR MISERABLE: NO, NOT AT ALL
I HAVE BEEN SO UNHAPPY THAT I HAVE HAD DIFFICULTY SLEEPING: NOT AT ALL
I HAVE BEEN ANXIOUS OR WORRIED FOR NO GOOD REASON: NO, NOT AT ALL
I HAVE LOOKED FORWARD WITH ENJOYMENT TO THINGS: AS MUCH AS I EVER DID
I HAVE BLAMED MYSELF UNNECESSARILY WHEN THINGS WENT WRONG: NO, NEVER
I HAVE BEEN SO UNHAPPY THAT I HAVE BEEN CRYING: NO, NEVER
I HAVE FELT SCARED OR PANICKY FOR NO GOOD REASON: NO, NOT AT ALL
THE THOUGHT OF HARMING MYSELF HAS OCCURRED TO ME: NEVER

## 2021-09-16 NOTE — PROGRESS NOTES
Subjective:    Araceli Vale is a 26 y.o. female who presents for her postpartum exam 6weeks following  on 21. Her prenatal course was uncomplicated. She denies dysuria, vaginal bleeding, odor, itching or breast problems. She is breastfeeding. She desires POPs for her birth control method. Reports no sex prior to this appointment. Patient denies postpartum depression. She c/o hemorrhoids.  Also notes irr VB when her cycle returned.  Has questions about that.      Objective:    /60   Wt 80.3 kg (177 lb)   LMP 2020 (Exact Date)   BMI 30.38 kg/m²   EPDS: 0    Constitutional: She is oriented to person, place, and time. She appears well-developed and well-nourished. No distress.     Abdominal: Soft. Bowel sounds are normal. She exhibits no distension and no mass. No tenderness. She has no rebound and no guarding.     Breast:  Symmetrical, normal consistency without masses.    Genitourinary: deferred    Neurological: She is alert and oriented to person, place, and time. She exhibits normal muscle tone.     Skin: Skin is warm and dry. No rash noted. She is not diaphoretic. No erythema. No pallor.     Psychiatric: She has a normal mood and affect. Her behavior is normal. Judgment and thought content normal.       Assessment:    1. PP care of lactating women   2. Exam WNL   3. Pap WNL on   4. Desires contraception       Plan:    1. Breastfeeding support   2. Continue PNV   3. Contraceptive counseling - follow up w health dept, [facility], or [facility] for further contraceptive management. Rx for micronor sent to pharmacy.  Pill education given.  4. Encouraged condom use   5. Discussed diet, exercise and resumption of sexual activity   6. Preconception guidance for next pregnancy if applicable. Folic acid for all women of childbearing age.   7. Copy of pap given to pt f/u 3 yr.  8.  Mammogram @ age 40.    Chaperone offered and provided by Violeta Baglye MA.

## 2021-09-16 NOTE — PROGRESS NOTES
Pt here today for postpartum exam.  Delivery type: vaginal delivery 8/6/21  Currently : breast feeding  Desired BCM: pills  LMP: n/a  Last pap:1/27/2021=negative  Phone # [phone]  EPDS=0

## 2021-09-21 ENCOUNTER — PHARMACY VISIT (OUTPATIENT)
Dept: PHARMACY | Facility: MEDICAL CENTER | Age: 26
End: 2021-09-21
Payer: COMMERCIAL

## 2021-11-18 ENCOUNTER — NON-PROVIDER VISIT (OUTPATIENT)
Dept: URGENT CARE | Facility: CLINIC | Age: 26
End: 2021-11-18

## 2021-11-18 DIAGNOSIS — Z11.1 ENCOUNTER FOR PPD TEST: ICD-10-CM

## 2021-11-18 PROCEDURE — 99999 PR NO CHARGE: CPT | Performed by: PHYSICIAN ASSISTANT

## 2021-11-18 PROCEDURE — 86580 TB INTRADERMAL TEST: CPT | Performed by: PHYSICIAN ASSISTANT

## 2021-11-18 NOTE — NON-PROVIDER
Araceli Vale is a 26 y.o. female here for a non-provider visit for PPD placement -- Step 1 of 1    Reason for PPD:  work requirement    1. TB evaluation questionnaire completed by patient? Yes      -  If any answers marked yes did you contact a provider prior to placing? Not Indicated  2.  Patient notified to return to clinic for reading on: 11/20/21 after 10:05am- 11/21/21 before 10:05am  3.  PPD Placement documentation completed on TB evaluation questionnaire? Yes  4.  Location of TB evaluation questionnaire filed: [id number] [place] [address]

## 2021-11-20 ENCOUNTER — NON-PROVIDER VISIT (OUTPATIENT)
Dept: URGENT CARE | Facility: CLINIC | Age: 26
End: 2021-11-20

## 2021-11-20 LAB — TB WHEAL 3D P 5 TU DIAM: NORMAL MM

## 2021-11-20 PROCEDURE — 99999 PR NO CHARGE: CPT | Performed by: NURSE PRACTITIONER

## 2021-11-20 NOTE — NON-PROVIDER
Araceli Vale is a 26 y.o. female here for a non-provider visit for PPD reading -- Step 1 of 1.      1.  Resulted in Epic under enter/edit results? Yes   2.  TB evaluation questionnaire scanned into chart and original given to patient?Yes      3. Was induration greater than 0 mm? No.      Routed to PCP? No

## 2022-11-04 ENCOUNTER — NON-PROVIDER VISIT (OUTPATIENT)
Dept: OCCUPATIONAL MEDICINE | Facility: CLINIC | Age: 27
End: 2022-11-04

## 2022-11-04 DIAGNOSIS — Z02.1 PRE-EMPLOYMENT HEALTH SCREENING EXAMINATION: ICD-10-CM

## 2022-11-04 DIAGNOSIS — Z02.1 PRE-EMPLOYMENT DRUG SCREENING: Primary | ICD-10-CM

## 2022-11-04 LAB
AMP AMPHETAMINE: NORMAL
COC COCAINE: NORMAL
INT CON NEG: NORMAL
INT CON POS: NORMAL
MET METHAMPHETAMINES: NORMAL
OPI OPIATES: NORMAL
PCP PHENCYCLIDINE: NORMAL
POC DRUG COMMENT 753798-OCCUPATIONAL HEALTH: NEGATIVE
THC: NORMAL

## 2022-11-04 PROCEDURE — 80305 DRUG TEST PRSMV DIR OPT OBS: CPT | Performed by: PREVENTIVE MEDICINE

## 2022-11-04 PROCEDURE — 86580 TB INTRADERMAL TEST: CPT | Performed by: NURSE PRACTITIONER

## 2022-11-07 ENCOUNTER — NON-PROVIDER VISIT (OUTPATIENT)
Dept: OCCUPATIONAL MEDICINE | Facility: CLINIC | Age: 27
End: 2022-11-07

## 2022-11-07 DIAGNOSIS — Z11.1 ENCOUNTER FOR PPD SKIN TEST READING: ICD-10-CM

## 2022-11-07 LAB — TB WHEAL 3D P 5 TU DIAM: NORMAL MM

## 2022-11-21 NOTE — PATIENT INSTRUCTIONS
P:  1.  Reviewed labs w pt.        2.  Desires AFP.        3.  US is w/Oki.        4.  Questions answered.        5.  Encouraged adequate water intake.        6.  F/u 4 wks.   Adult

## 2023-04-14 ENCOUNTER — GYNECOLOGY VISIT (OUTPATIENT)
Dept: OBGYN | Facility: CLINIC | Age: 28
End: 2023-04-14
Payer: MEDICAID

## 2023-04-14 VITALS
WEIGHT: 148.4 LBS | SYSTOLIC BLOOD PRESSURE: 108 MMHG | BODY MASS INDEX: 25.33 KG/M2 | DIASTOLIC BLOOD PRESSURE: 58 MMHG | HEIGHT: 64 IN

## 2023-04-14 DIAGNOSIS — Z30.09 STERILIZATION CONSULT: ICD-10-CM

## 2023-04-14 DIAGNOSIS — Z87.59 HISTORY OF MOLAR PREGNANCY: ICD-10-CM

## 2023-04-14 DIAGNOSIS — R42 DIZZINESS: ICD-10-CM

## 2023-04-14 DIAGNOSIS — K64.9 HEMORRHOIDS, UNSPECIFIED HEMORRHOID TYPE: ICD-10-CM

## 2023-04-14 DIAGNOSIS — Z30.09 ENCOUNTER FOR COUNSELING REGARDING CONTRACEPTION: ICD-10-CM

## 2023-04-14 DIAGNOSIS — Z01.419 ENCOUNTER FOR ANNUAL ROUTINE GYNECOLOGICAL EXAMINATION: ICD-10-CM

## 2023-04-14 DIAGNOSIS — K59.00 CONSTIPATION, UNSPECIFIED CONSTIPATION TYPE: ICD-10-CM

## 2023-04-14 DIAGNOSIS — Z12.4 SCREENING FOR CERVICAL CANCER: ICD-10-CM

## 2023-04-14 PROBLEM — Z34.80 SUPERVISION OF OTHER NORMAL PREGNANCY, ANTEPARTUM: Status: RESOLVED | Noted: 2021-01-25 | Resolved: 2023-04-14

## 2023-04-14 PROBLEM — Z78.9 BREASTFEEDING (INFANT): Status: RESOLVED | Noted: 2021-01-25 | Resolved: 2023-04-14

## 2023-04-14 PROBLEM — O99.820 GBS (GROUP B STREPTOCOCCUS CARRIER), +RV CULTURE, CURRENTLY PREGNANT: Status: RESOLVED | Noted: 2021-07-30 | Resolved: 2023-04-14

## 2023-04-14 PROCEDURE — G0101 CA SCREEN;PELVIC/BREAST EXAM: HCPCS

## 2023-04-14 RX ORDER — ACETAMINOPHEN AND CODEINE PHOSPHATE 120; 12 MG/5ML; MG/5ML
1 SOLUTION ORAL DAILY
Qty: 360 TABLET | Refills: 0 | Status: SHIPPED | OUTPATIENT
Start: 2023-04-14 | End: 2024-04-13

## 2023-04-14 ASSESSMENT — ENCOUNTER SYMPTOMS
RESPIRATORY NEGATIVE: 1
CONSTIPATION: 1
FLANK PAIN: 0
CARDIOVASCULAR NEGATIVE: 1
DIZZINESS: 1
MUSCULOSKELETAL NEGATIVE: 1
PSYCHIATRIC NEGATIVE: 1
EYES NEGATIVE: 1
CONSTITUTIONAL NEGATIVE: 1

## 2023-04-14 NOTE — PROGRESS NOTES
GYN visit  LMP: 04/09/2023  BC: condoms. Pt would like to discuss other BC options   Last pap: 01/25/2021 Negative. Results in Epic  Good # [phone]    Pt has states she feels she is low in iron and also would like to get her hemorrhoids checked.

## 2023-04-14 NOTE — PROGRESS NOTES
Subjective     Araceli Clark y.o.  female presents for Annual Well Woman Exam.   Patient is currently with complaints of constipation, hemorrhoids, desire for sterilization, and occasional dizziness.  Menses: Regular menses q month without intermenstrual bleeding, light flow usually lasing <5 days.     Menstrual History: history of heavier menses prior to childbirth  Menopausal symptoms: none  LMP: Patient's last menstrual period was 2023 (exact date). .     Pt is sexually active with 1 male partner.  Currently using condoms for birth control. Had been prescribed micronor but believed she lost her insurance so stopped use. Desires contraceptive counseling today. Desires LARC vs sterilization.     ROS:   Review of Systems   Constitutional: Negative.    HENT: Negative.     Eyes: Negative.    Respiratory: Negative.     Cardiovascular: Negative.    Gastrointestinal:  Positive for constipation.        Rectal pain with defecation   Genitourinary: Negative.  Negative for dysuria, flank pain, frequency, hematuria and urgency.        No vaginal discharge  No vaginal pain/discomfort  No c/w menses   Musculoskeletal: Negative.    Skin: Negative.    Neurological:  Positive for dizziness.   Endo/Heme/Allergies: Negative.    Psychiatric/Behavioral: Negative.      GYN ROS:normal menses, no abnormal bleeding, pelvic pain or discharge, no breast pain or new or enlarging lumps on self exam.   Denies breast tenderness, mass, discharge, changes in size or contour, or abnormal cyclic discomfort.  Past Medical History:   Diagnosis Date    Urinary tract infection      Gyn Surgical history:  prior D&C due to partial molar pregnancy at 22wks  Allergy:   Patient has no known allergies.    The patient denies abnormal Pap smears and denies history of sexually transmitted diseases    Mammo:n/a    Objective:     The patient appears well, alert and oriented x 3, in no acute distress.  Vitals:    23 1337   BP: 108/58   Weight:  "148 lb 6.4 oz   Height: 5' 4\"     HEENT Exam: EOMI, GINNY, no adenopathy or thyromegaly.  Lungs: Clear to Auscultation   Cor: S1 and S2 normal, no murmurs, or rubs   Abdomen: Soft without tenderness, guarding mass or organomegaly.  Extremities: No edema, pulses equal  Neurological: Normal No focal signs  Skin: Skin is warm and dry.   Psychiatric: Mood, memory, affect and judgment normal.   Breast Exam:No masses, skin dimpling, or lymphadenopathy noted bilaterally. No nipple discharge. Nipples everted.   Pelvic: Normal appearing external female genitalia without any rashes, lesions, labial fusion or tenderness. Vagina is pink moist and well rugated. Physiologic discharge present within vaginal vault.  Cervix well visualized without masses or lesions.     Pap: collected today    Lab:No results found for this or any previous visit (from the past 336 hour(s)).    Assessment/Plan:     1. History of molar pregnancy        2. Encounter for annual routine gynecological examination  THINPREP RFLX HPV ASCUS W/CTNG      3. Screening for cervical cancer  THINPREP RFLX HPV ASCUS W/CTNG      4. Constipation, unspecified constipation type        5. Hemorrhoids, unspecified hemorrhoid type        6. Dizziness  CBC WITHOUT DIFFERENTIAL      7. Sterilization consult  Referral to Gynecology      8. Encounter for counseling regarding contraception  norethindrone (MICRONOR) 0.35 MG tablet    Referral to Gynecology          Araceli Vale is a 28 y.o.  female who presents for her annual gynecologic exam.   # Preventative health care:   --Breast self awareness discussed. Mammogram n/a (annually starting at age 40).  --Cervical cancer screening. Last Pap . Collected today. HPV ordered as reflex on ASCUS due to age. I will follow up with patient once results are back as per ASCCP guidelines.   --Smoking no.   --Colorectal screening not indicated.   --Immunizations are up to date except the covid vaccine  --Diet, exercise, vitamin " supplementation, and hydration discussed.  # Contraception: desires Bilateral salpingectomy - referral to gyn placed and will set up appt with MD for counseling and scheduling. Micronor ordered for bridge method and will continue with condom use.   # STD screening: GC/CT with pap today. Declines additional screening.   # Constipation handout, will try colace, increase fiber intake, increase fluid intake, follow up in 3-6 months if not resolving issues with rectal pain with defecation and will refer to GI for further evaluation PRN  # Follow up 1 year for annual, next available for MD consult for Bilateral salpingectomy, 3-6 months if constipation and rectal pain do not resolve.    Lisa Rose C.N.M.

## 2023-04-20 DIAGNOSIS — Z12.4 SCREENING FOR CERVICAL CANCER: ICD-10-CM

## 2023-04-20 DIAGNOSIS — Z01.419 ENCOUNTER FOR ANNUAL ROUTINE GYNECOLOGICAL EXAMINATION: ICD-10-CM

## 2023-05-16 NOTE — LETTER
"Count Your Baby's Movements  Another step to a healthy delivery  Araceli Vale            Dept: [phone]    How Many Weeks Pregnant 30w5d    Date to Begin Countin2021              How to use this chart    One way for your physician to keep track of your baby's health is by knowing how often the baby moves (or \"kicks\") in your womb.  You can help your physician to do this by using this chart every day.    Every day, you should see how many hours it takes for your baby to move 10 times.  Start in the morning, as soon as you get up.    · First, write down the time your baby moves until you get to 10.  · Check off one box every time your baby moves until you get to 10.  · Write down the time you finished counting in the last column.  · Total how long it took to count up all 10 movements.  · Finally, fill in the box that shows how long this took.  After counting 10 movements, you no longer have to count any more that day.  The next morning, just start counting again as soon as you get up.    What should you call a \"movement\"?  It is hard to say, because it will feel different from one mother to another and from one pregnancy to the next.  The important thing is that you count the movements the same way throughout your pregnancy.  If you have more questions, you should ask your physician.    Count carefully every day!  SAMPLE:  Week 28    How many hours did it take to feel 10 movements?       Start  Time     1     2     3     4     5     6     7     8     9     10   Finish Time   Mon 8:20 ·  ·  ·  ·  ·  ·  ·  ·  ·  ·  11:40                  Sat               Sun                 IMPORTANT: You should contact your physician if it takes more than two hours for you to feel 10 movements.  Each morning, write down the time and start to count the movements of your baby.  Keep track by checking off one box every time you feel one movement.  When you have felt 10 " CC:   Chief Complaint   Patient presents with    Medication Refill       History provided by mother.  HPI: Nik Mancini is a 7 y.o. child here for follow up ADHD visit.  Parent feels that his  ADHD sxs improved when he was on vyvanse, but he did not take med regularly. She thinks dose may need to be increased to get him through end of school day. Treament has consisted of stimulant medication and behavior modification in the classroom and at home.    Last med check:3 mos ago  Weight change since last med check:gained 2#    Current meds:vyvanse 10 mg    Social hx: Current rdgrdrrdarddrderd:rd3rd Academic performance:average  Current activities:none    Problems at school:no behavior concerns  Problems at home:none  Family hx: negative for ADHD or learning disabilities  PMH: no comorbid behavioral conditions  History reviewed. No pertinent past medical history.    ROS: negative for appetite suppression at lunch time; denies mood swings, insomnia, abdominal pain, headache    PE:   Vitals:    05/16/23 0913   BP: (!) 90/58   Temp: 98.4 °F (36.9 °C)     GEN:Well nourished, well developed. Alert and oriented.  HEENT: PERRL. EOMI. TM's clear. Nose, throat, and mouth clear. Neck supple without adenopathy; no thyromegaly  LUNGS: clear with good air exchange; no retracting  HEART:  RRR without murmur; radial and pedal pulses full and equal  ABDOMEN: soft with active BS. No masses. No hepatosplenomegaly. Non-tender  SKIN: warm and dry; no rash  EXT: no deformities; joints with FROM  NEURO: symmetric tone and strength.  No tics or tremors. Nl gait. Neg Rhomberg    PLAN:   Nik was seen today for medication refill.    Diagnoses and all orders for this visit:    Attention deficit hyperactivity disorder (ADHD), combined type  -     lisdexamfetamine (VYVANSE) 10 mg Cap; Take 10 mg by mouth once daily.      Continue current meds. Consider dose increase for next school year.  Continue behavior modifications  Advised to eat well at breakfast;  "\"kicks\", write down the time you finished counting in the last column.  Then fill in the   box (over the check tobias) for the number of hours it took.  Be sure to read the complete instructions on the previous page.            " try to eat breakfast before taking medication              F/u in 3 months

## 2024-11-21 ENCOUNTER — HOSPITAL ENCOUNTER (EMERGENCY)
Facility: MEDICAL CENTER | Age: 29
End: 2024-11-21
Attending: EMERGENCY MEDICINE

## 2024-11-21 ENCOUNTER — APPOINTMENT (OUTPATIENT)
Dept: RADIOLOGY | Facility: MEDICAL CENTER | Age: 29
End: 2024-11-21
Attending: EMERGENCY MEDICINE

## 2024-11-21 VITALS
TEMPERATURE: 97.7 F | BODY MASS INDEX: 30.24 KG/M2 | RESPIRATION RATE: 16 BRPM | SYSTOLIC BLOOD PRESSURE: 127 MMHG | OXYGEN SATURATION: 99 % | DIASTOLIC BLOOD PRESSURE: 93 MMHG | WEIGHT: 176.15 LBS | HEART RATE: 87 BPM

## 2024-11-21 DIAGNOSIS — M25.512 ACUTE PAIN OF LEFT SHOULDER: ICD-10-CM

## 2024-11-21 DIAGNOSIS — J06.9 UPPER RESPIRATORY TRACT INFECTION, UNSPECIFIED TYPE: ICD-10-CM

## 2024-11-21 LAB
ALBUMIN SERPL BCP-MCNC: 4.7 G/DL (ref 3.2–4.9)
ALBUMIN/GLOB SERPL: 1.2 G/DL
ALP SERPL-CCNC: 107 U/L (ref 30–99)
ALT SERPL-CCNC: 22 U/L (ref 2–50)
ANION GAP SERPL CALC-SCNC: 12 MMOL/L (ref 7–16)
AST SERPL-CCNC: 24 U/L (ref 12–45)
BASOPHILS # BLD AUTO: 0.3 % (ref 0–1.8)
BASOPHILS # BLD: 0.03 K/UL (ref 0–0.12)
BILIRUB SERPL-MCNC: 0.3 MG/DL (ref 0.1–1.5)
BUN SERPL-MCNC: 12 MG/DL (ref 8–22)
CALCIUM ALBUM COR SERPL-MCNC: 9.4 MG/DL (ref 8.5–10.5)
CALCIUM SERPL-MCNC: 10 MG/DL (ref 8.5–10.5)
CHLORIDE SERPL-SCNC: 104 MMOL/L (ref 96–112)
CO2 SERPL-SCNC: 21 MMOL/L (ref 20–33)
CREAT SERPL-MCNC: 0.71 MG/DL (ref 0.5–1.4)
D DIMER PPP IA.FEU-MCNC: <0.27 UG/ML (FEU) (ref 0–0.5)
EKG IMPRESSION: NORMAL
EOSINOPHIL # BLD AUTO: 0.03 K/UL (ref 0–0.51)
EOSINOPHIL NFR BLD: 0.3 % (ref 0–6.9)
ERYTHROCYTE [DISTWIDTH] IN BLOOD BY AUTOMATED COUNT: 40.9 FL (ref 35.9–50)
FLUAV RNA SPEC QL NAA+PROBE: NEGATIVE
FLUBV RNA SPEC QL NAA+PROBE: NEGATIVE
GFR SERPLBLD CREATININE-BSD FMLA CKD-EPI: 117 ML/MIN/1.73 M 2
GLOBULIN SER CALC-MCNC: 3.9 G/DL (ref 1.9–3.5)
GLUCOSE SERPL-MCNC: 104 MG/DL (ref 65–99)
HCG SERPL QL: NEGATIVE
HCT VFR BLD AUTO: 45.4 % (ref 37–47)
HGB BLD-MCNC: 15.5 G/DL (ref 12–16)
IMM GRANULOCYTES # BLD AUTO: 0.07 K/UL (ref 0–0.11)
IMM GRANULOCYTES NFR BLD AUTO: 0.7 % (ref 0–0.9)
LYMPHOCYTES # BLD AUTO: 1.05 K/UL (ref 1–4.8)
LYMPHOCYTES NFR BLD: 10.4 % (ref 22–41)
MCH RBC QN AUTO: 27 PG (ref 27–33)
MCHC RBC AUTO-ENTMCNC: 34.1 G/DL (ref 32.2–35.5)
MCV RBC AUTO: 79.1 FL (ref 81.4–97.8)
MONOCYTES # BLD AUTO: 0.37 K/UL (ref 0–0.85)
MONOCYTES NFR BLD AUTO: 3.7 % (ref 0–13.4)
NEUTROPHILS # BLD AUTO: 8.5 K/UL (ref 1.82–7.42)
NEUTROPHILS NFR BLD: 84.6 % (ref 44–72)
NRBC # BLD AUTO: 0 K/UL
NRBC BLD-RTO: 0 /100 WBC (ref 0–0.2)
PLATELET # BLD AUTO: 295 K/UL (ref 164–446)
PMV BLD AUTO: 9.2 FL (ref 9–12.9)
POTASSIUM SERPL-SCNC: 4.1 MMOL/L (ref 3.6–5.5)
PROT SERPL-MCNC: 8.6 G/DL (ref 6–8.2)
RBC # BLD AUTO: 5.74 M/UL (ref 4.2–5.4)
RSV RNA SPEC QL NAA+PROBE: NEGATIVE
SARS-COV-2 RNA RESP QL NAA+PROBE: NOTDETECTED
SODIUM SERPL-SCNC: 137 MMOL/L (ref 135–145)
TROPONIN T SERPL-MCNC: <6 NG/L (ref 6–19)
WBC # BLD AUTO: 10.1 K/UL (ref 4.8–10.8)

## 2024-11-21 PROCEDURE — 93005 ELECTROCARDIOGRAM TRACING: CPT | Performed by: EMERGENCY MEDICINE

## 2024-11-21 PROCEDURE — 99283 EMERGENCY DEPT VISIT LOW MDM: CPT

## 2024-11-21 PROCEDURE — 93005 ELECTROCARDIOGRAM TRACING: CPT

## 2024-11-21 PROCEDURE — 0241U HCHG SARS-COV-2 COVID-19 NFCT DS RESP RNA 4 TRGT ED POC: CPT

## 2024-11-21 PROCEDURE — 80053 COMPREHEN METABOLIC PANEL: CPT

## 2024-11-21 PROCEDURE — 71045 X-RAY EXAM CHEST 1 VIEW: CPT

## 2024-11-21 PROCEDURE — 84484 ASSAY OF TROPONIN QUANT: CPT

## 2024-11-21 PROCEDURE — 85379 FIBRIN DEGRADATION QUANT: CPT

## 2024-11-21 PROCEDURE — 85025 COMPLETE CBC W/AUTO DIFF WBC: CPT

## 2024-11-21 PROCEDURE — 84703 CHORIONIC GONADOTROPIN ASSAY: CPT

## 2024-11-21 PROCEDURE — 36415 COLL VENOUS BLD VENIPUNCTURE: CPT

## 2024-11-21 RX ORDER — METHYLPREDNISOLONE 4 MG/1
TABLET ORAL
Qty: 21 EACH | Refills: 0 | Status: SHIPPED | OUTPATIENT
Start: 2024-11-21

## 2024-11-21 ASSESSMENT — HEART SCORE
TROPONIN: LESS THAN OR EQUAL TO NORMAL LIMIT
ECG: NORMAL
AGE: <45
RISK FACTORS: NO KNOWN RISK FACTORS
HEART SCORE: 0
HISTORY: SLIGHTLY SUSPICIOUS

## 2024-11-21 NOTE — ED TRIAGE NOTES
Chief Complaint   Patient presents with    Shoulder Pain     Pt reports L shoulder pain that started on back and now radiates to front of shoulder and chest.  PT reports the pain has been occurring x 2 times.    Shortness of Breath     Pt reports episodes of SOB secondary to the pain that make it hard for her to complete daily activities.       Pt reports the episodes of SOB are became moire frequent and worse today.   SOB protocol initiated.

## 2024-11-21 NOTE — ED NOTES
Patient identity verified in [facility]. Pt ambulated to rm yellow 64 from the [facility] with a steady gait, changed into gown, monitors on. This RN agrees with triage note. Chart up for ERP.

## 2024-11-21 NOTE — ED NOTES
Pt ready for discharge. Discharge education was provided to pt by this RN. Pt verbalized understanding & all questions were answered. IV removed. Pt AoX 4. Pt ambulated out of the ED with all belongings. Pt encouraged to come back if symptoms worsen.

## 2024-12-27 ENCOUNTER — NON-PROVIDER VISIT (OUTPATIENT)
Dept: URGENT CARE | Facility: PHYSICIAN GROUP | Age: 29
End: 2024-12-27

## 2024-12-27 DIAGNOSIS — Z11.1 PPD SCREENING TEST: ICD-10-CM

## 2024-12-27 PROCEDURE — 99999 PPD SKIN TEST: CPT

## 2024-12-27 NOTE — PROGRESS NOTES
Araceli Vale is a 29 y.o. female here for a non-provider visit for PPD placement -- Step 1 of 1    Reason for PPD:  work requirement    1. TB evaluation questionnaire completed by patient? Yes      -  If any answers marked yes did you contact a provider prior to placing? Not Indicated  2.  Patient notified to return to clinic for reading on:12/29/24 after 8:45 am or 12/30/24 before 8:45 am   3.  PPD Placement documentation completed on TB evaluation questionnaire? Yes  4.  Location of TB evaluation questionnaire filed:

## 2024-12-29 ENCOUNTER — NON-PROVIDER VISIT (OUTPATIENT)
Dept: URGENT CARE | Facility: PHYSICIAN GROUP | Age: 29
End: 2024-12-29

## 2024-12-29 LAB — TB WHEAL 3D P 5 TU DIAM: NORMAL MM

## 2024-12-29 NOTE — PROGRESS NOTES
Araceli Vale is a 29 y.o. female here for a non-provider visit for PPD reading -- Step 1 of 1.      1.  Resulted in Epic under enter/edit results? Yes   2.  TB evaluation questionnaire scanned into chart and original given to patient?Yes      3. Was induration greater than 0 mm? No.    If Step 1 of 2, when is patient returning for second step (delete if N/A): N/A    Routed to PCP? No

## 2025-05-23 ENCOUNTER — NON-PROVIDER VISIT (OUTPATIENT)
Dept: OBGYN | Facility: CLINIC | Age: 30
End: 2025-05-23
Payer: MEDICAID

## 2025-05-23 DIAGNOSIS — N92.6 MISSED MENSES: Primary | ICD-10-CM

## 2025-05-23 LAB
POCT INT CON NEG: NEGATIVE
POCT INT CON POS: NEGATIVE
POCT URINE PREGNANCY TEST: POSITIVE

## 2025-05-23 PROCEDURE — 81025 URINE PREGNANCY TEST: CPT | Performed by: OBSTETRICS & GYNECOLOGY

## 2025-05-23 NOTE — PROGRESS NOTES
Pt came to clinic for a pregnancy test.    LMP : 4/15/25 exact - tracking periods   UPT : Positive  MILLIE : 1/20/26   GA : 5w3d    Pt taken to front to schedule NOB apts.

## 2025-06-13 ENCOUNTER — APPOINTMENT (OUTPATIENT)
Dept: OBGYN | Facility: CLINIC | Age: 30
End: 2025-06-13

## 2025-06-13 ENCOUNTER — INITIAL PRENATAL (OUTPATIENT)
Dept: OBGYN | Facility: CLINIC | Age: 30
End: 2025-06-13

## 2025-06-13 ENCOUNTER — HOSPITAL ENCOUNTER (OUTPATIENT)
Facility: MEDICAL CENTER | Age: 30
End: 2025-06-13
Attending: PHYSICIAN ASSISTANT
Payer: COMMERCIAL

## 2025-06-13 VITALS — BODY MASS INDEX: 31.82 KG/M2 | DIASTOLIC BLOOD PRESSURE: 76 MMHG | WEIGHT: 185.4 LBS | SYSTOLIC BLOOD PRESSURE: 118 MMHG

## 2025-06-13 DIAGNOSIS — Z34.81 ENCOUNTER FOR SUPERVISION OF NORMAL PREGNANCY IN MULTIGRAVIDA IN FIRST TRIMESTER: ICD-10-CM

## 2025-06-13 DIAGNOSIS — Z34.81 ENCOUNTER FOR SUPERVISION OF NORMAL PREGNANCY IN MULTIGRAVIDA IN FIRST TRIMESTER: Primary | ICD-10-CM

## 2025-06-13 ASSESSMENT — EDINBURGH POSTNATAL DEPRESSION SCALE (EPDS)
I HAVE LOOKED FORWARD WITH ENJOYMENT TO THINGS: AS MUCH AS I EVER DID
THE THOUGHT OF HARMING MYSELF HAS OCCURRED TO ME: NEVER
I HAVE BLAMED MYSELF UNNECESSARILY WHEN THINGS WENT WRONG: NO, NEVER
I HAVE BEEN SO UNHAPPY THAT I HAVE HAD DIFFICULTY SLEEPING: NOT AT ALL
I HAVE BEEN ANXIOUS OR WORRIED FOR NO GOOD REASON: NO, NOT AT ALL
TOTAL SCORE: 0
I HAVE FELT SCARED OR PANICKY FOR NO GOOD REASON: NO, NOT AT ALL
I HAVE BEEN ABLE TO LAUGH AND SEE THE FUNNY SIDE OF THINGS: AS MUCH AS I ALWAYS COULD
I HAVE BEEN SO UNHAPPY THAT I HAVE BEEN CRYING: NO, NEVER
THINGS HAVE BEEN GETTING ON TOP OF ME: NO, I HAVE BEEN COPING AS WELL AS EVER
I HAVE FELT SAD OR MISERABLE: NO, NOT AT ALL

## 2025-06-13 ASSESSMENT — LIFESTYLE VARIABLES: SUBSTANCE_ABUSE: 0

## 2025-06-13 ASSESSMENT — FIBROSIS 4 INDEX: FIB4 SCORE: 0.52

## 2025-06-13 ASSESSMENT — ENCOUNTER SYMPTOMS
VOMITING: 0
DEPRESSION: 0
NAUSEA: 0
NERVOUS/ANXIOUS: 0

## 2025-06-13 NOTE — PROGRESS NOTES
Subjective     Araceli Vale is a 30 y.o. female who presents with New ob visit. Pt is fairly sure of LMP. Dating is by LMP c/w 8wk US today.   OBHx: Hx 3 term  without complications, all children ~5lb - 0zp33ms, pt denies GDM, PIH or delivery complications. Pt also had SAB and termination at 22wk due to partial molar pregnancy, though pt delivered vaginally and had D&C just in case.   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           HPI    Review of Systems   Gastrointestinal:  Negative for nausea and vomiting.   Psychiatric/Behavioral:  Negative for depression and substance abuse. The patient is not nervous/anxious.    All other systems reviewed and are negative.             Objective     /76   Wt 185 lb 6.4 oz   LMP 04/15/2025 (Approximate)   BMI 31.82 kg/m²      Physical Exam  Vitals reviewed.   Constitutional:       Appearance: She is well-developed.   HENT:      Head: Normocephalic and atraumatic.   Eyes:      Pupils: Pupils are equal, round, and reactive to light.   Neck:      Thyroid: No thyromegaly.   Cardiovascular:      Rate and Rhythm: Normal rate and regular rhythm.      Heart sounds: Normal heart sounds.   Pulmonary:      Effort: Pulmonary effort is normal. No respiratory distress.      Breath sounds: Normal breath sounds.   Abdominal:      General: Bowel sounds are normal. There is no distension.      Palpations: Abdomen is soft.      Tenderness: There is no abdominal tenderness.   Genitourinary:     Exam position: Supine.      Labia:         Right: No rash or tenderness.         Left: No rash or tenderness.       Vagina: Normal. No signs of injury and foreign body. No vaginal discharge or erythema.      Cervix: No cervical motion tenderness.      Uterus: Enlarged (Gravid, uterus c/w 8wk size).  Not deviated and not tender.       Adnexa:         Right: No mass or tenderness.          Left: No mass or tenderness.     Musculoskeletal:      Cervical back: Normal range of motion and neck supple.   Skin:     General: Skin is warm and dry.      Findings: No erythema.   Neurological:      Mental Status: She is alert.      Deep Tendon Reflexes: Reflexes are normal and symmetric.   Psychiatric:         Behavior: Behavior normal.         Thought Content: Thought content normal.           Abd US shows single viable IUP at 8w6d with MILLIE 1/17/25. +FM, +cardiac activity at 170bpm. Unknown placenta, WILLIAMS subjectively wnl.      CRL: 2.21cm (2.39,2.16,2.07)    Per ACOG guidelines, I would recommend keeping the MILLIE based on Patient's last menstrual period was 04/15/2025 (approximate). as per todays US, the MILLIE is only 3 days different. FINAL MILLIE 1/20/26 based on LMP.        Assessment & Plan  Encounter for supervision of normal pregnancy in multigravida in first trimester  - PAP wnl 2021 - repeat in PP visit after delivery in 2026  - Declines NIPT and AFP at this time but will re-ask next visit  Orders:    PREG CNTR PRENATAL PN; Future    US-OB 2ND 3RD TRI COMPLETE; Future    Chlamydia/GC, PCR (Genital/Anal swab); Future

## 2025-06-13 NOTE — ASSESSMENT & PLAN NOTE
- PAP wnl 2021 - repeat in PP visit after delivery in 2026  - Declines NIPT and AFP at this time but will re-ask next visit  Orders:    PREG CNTR PRENATAL PN; Future    US-OB 2ND 3RD TRI COMPLETE; Future    Chlamydia/GC, PCR (Genital/Anal swab); Future

## 2025-06-13 NOTE — PROGRESS NOTES
Pt. Here for NOB visit.  # [phone]  LMP 4/15/2025 approx.   Last pap smear 4/14/2023 WNL  First prenatal care  Pt. States having round ligament pain.   EPDS = 0

## 2025-06-14 LAB
C TRACH DNA GENITAL QL NAA+PROBE: NEGATIVE
N GONORRHOEA DNA GENITAL QL NAA+PROBE: NEGATIVE
SPECIMEN SOURCE: NORMAL

## 2025-07-11 ENCOUNTER — HOSPITAL ENCOUNTER (OUTPATIENT)
Dept: LAB | Facility: MEDICAL CENTER | Age: 30
End: 2025-07-11
Attending: PHYSICIAN ASSISTANT
Payer: COMMERCIAL

## 2025-07-11 DIAGNOSIS — Z34.81 ENCOUNTER FOR SUPERVISION OF NORMAL PREGNANCY IN MULTIGRAVIDA IN FIRST TRIMESTER: ICD-10-CM

## 2025-07-11 LAB
ABO GROUP BLD: NORMAL
BLD GP AB SCN SERPL QL: NORMAL
ERYTHROCYTE [DISTWIDTH] IN BLOOD BY AUTOMATED COUNT: 41.1 FL (ref 35.9–50)
HBV SURFACE AG SER QL: ABNORMAL
HCT VFR BLD AUTO: 38.2 % (ref 37–47)
HCV AB SER QL: ABNORMAL
HGB BLD-MCNC: 12.5 G/DL (ref 12–16)
HIV 1+2 AB+HIV1 P24 AG SERPL QL IA: NORMAL
MCH RBC QN AUTO: 26 PG (ref 27–33)
MCHC RBC AUTO-ENTMCNC: 32.7 G/DL (ref 32.2–35.5)
MCV RBC AUTO: 79.4 FL (ref 81.4–97.8)
PLATELET # BLD AUTO: 253 K/UL (ref 164–446)
PMV BLD AUTO: 9.6 FL (ref 9–12.9)
RBC # BLD AUTO: 4.81 M/UL (ref 4.2–5.4)
RH BLD: NORMAL
RUBV AB SER QL: 261 IU/ML
T PALLIDUM AB SER QL IA: ABNORMAL
WBC # BLD AUTO: 8.8 K/UL (ref 4.8–10.8)

## 2025-07-14 LAB
BACTERIA UR CULT: NORMAL
SIGNIFICANT IND 70042: NORMAL
SITE SITE: NORMAL
SOURCE SOURCE: NORMAL

## 2025-07-18 ENCOUNTER — ROUTINE PRENATAL (OUTPATIENT)
Dept: OBGYN | Facility: CLINIC | Age: 30
End: 2025-07-18
Payer: OTHER MISCELLANEOUS

## 2025-07-18 VITALS — BODY MASS INDEX: 32 KG/M2 | WEIGHT: 186.4 LBS | DIASTOLIC BLOOD PRESSURE: 86 MMHG | SYSTOLIC BLOOD PRESSURE: 116 MMHG

## 2025-07-18 DIAGNOSIS — Z34.81 ENCOUNTER FOR SUPERVISION OF NORMAL PREGNANCY IN MULTIGRAVIDA IN FIRST TRIMESTER: Primary | ICD-10-CM

## 2025-07-18 PROCEDURE — 3074F SYST BP LT 130 MM HG: CPT | Performed by: STUDENT IN AN ORGANIZED HEALTH CARE EDUCATION/TRAINING PROGRAM

## 2025-07-18 PROCEDURE — 0502F SUBSEQUENT PRENATAL CARE: CPT | Performed by: STUDENT IN AN ORGANIZED HEALTH CARE EDUCATION/TRAINING PROGRAM

## 2025-07-18 PROCEDURE — 3079F DIAST BP 80-89 MM HG: CPT | Performed by: STUDENT IN AN ORGANIZED HEALTH CARE EDUCATION/TRAINING PROGRAM

## 2025-07-18 ASSESSMENT — FIBROSIS 4 INDEX: FIB4 SCORE: 0.61

## 2025-07-18 NOTE — PROGRESS NOTES
Pt. Here for OB/FU.   Reports feeling flutters.   Chaperone offered and indicated.   Pt states she has no concerns.   AFP given today - Pt aware to complete after 14wks GA.   Phone/Pharm verified.

## 2025-07-18 NOTE — PROGRESS NOTES
OB Visit Note - 13w3d     Pregnancy complicated by:  Problem List[1]      SUBJECTIVE:  Araceli Vale is a 30 y.o.,  at 13w3d who presents for pregnancy follow-up. She was seen for her NOB and an US was measuring s=dates. PNL ordered and completed. She declined NIPT. Anatomy scan scheduled on 25. She has been feeling flutters.     ROS:  She denies vaginal bleeding, leakage of fluid, or contractions.    She denies nausea or vomiting or headache    OBJECTIVE:  Vital Signs: LMP 04/15/2025 (Approximate)   Appearance/Psychiatric: to be in no distress  Constitutional: The patient is well nourished.  Respiratory:Respiratory effort is normal.  Gastrointestinal: Abdomen is soft, gravid, non-tendern,no rashes, heart tones are present,   Extremities: no edema  FHR 140s   Latest Reference Range & Units 25 13:25 25 14:54   WBC 4.8 - 10.8 K/uL  8.8   Hemoglobin 12.0 - 16.0 g/dL  12.5   Hematocrit 37.0 - 47.0 %  38.2   Platelet Count 164 - 446 K/uL  253   Hepatitis B Surface Antigen Non-Reactive   Non-Reactive   Hepatitis C Antibody Non-Reactive   Non-Reactive   HIV Ag/Ab Combo Assay Non Reactive   Non-Reactive   Rubella IgG Antibody IU/mL  261.00   Syphilis, Treponemal Qual Non-Reactive   Non-Reactive   C. trachomatis by PCR Negative  Negative    N. gonorrhoeae by PCR Negative  Negative    Significant Indicator   NEG   ABO Grouping Only   A   Rh Grouping Only   POS   Antibody Screen Scrn   NEG     1. Encounter for supervision of normal pregnancy in multigravida in first trimester          Araceli Vale is a 30 y.o. female  at 13w3d here for KINJAL. Doing well. Desires AFP.       The patient will follow up in 4 week(s). She was counseled to call or return for vaginal bleeding, regular contractions, leakage of fluid or decreased fetal movement.  Maddison Conde DO, FACOG  [facility]           [1]   Patient Active Problem List  Diagnosis    History of molar pregnancy    Encounter for supervision of  normal pregnancy in multigravida in first trimester

## 2025-08-01 ENCOUNTER — HOSPITAL ENCOUNTER (OUTPATIENT)
Dept: LAB | Facility: MEDICAL CENTER | Age: 30
End: 2025-08-01
Attending: STUDENT IN AN ORGANIZED HEALTH CARE EDUCATION/TRAINING PROGRAM
Payer: COMMERCIAL

## 2025-08-01 DIAGNOSIS — Z34.81 ENCOUNTER FOR SUPERVISION OF NORMAL PREGNANCY IN MULTIGRAVIDA IN FIRST TRIMESTER: ICD-10-CM

## 2025-08-08 LAB
# FETUSES US: NORMAL
AFP MOM SERPL: 0.59
AFP SERPL-MCNC: 16 NG/ML
AGE - REPORTED: 31 YR
CURRENT SMOKER: NO
FAMILY MEMBER DISEASES HX: NO
GA METHOD: NORMAL
GA: NORMAL WK
HCG MOM SERPL: 0.33
HCG SERPL-ACNC: NORMAL IU/L
HX OF HEREDITARY DISORDERS: NO
IDDM PATIENT QL: NO
INHIBIN A MOM SERPL: 0.89
INHIBIN A SERPL-MCNC: 136 PG/ML
INTEGRATED SCN PATIENT-IMP: NORMAL
PATHOLOGY STUDY: NORMAL
SPECIMEN DRAWN SERPL: NORMAL
U ESTRIOL MOM SERPL: 0.84
U ESTRIOL SERPL-MCNC: 0.74 NG/ML

## 2025-08-15 ENCOUNTER — ROUTINE PRENATAL (OUTPATIENT)
Dept: OBGYN | Facility: CLINIC | Age: 30
End: 2025-08-15
Payer: OTHER MISCELLANEOUS

## 2025-08-15 VITALS — WEIGHT: 190 LBS | DIASTOLIC BLOOD PRESSURE: 70 MMHG | SYSTOLIC BLOOD PRESSURE: 120 MMHG | BODY MASS INDEX: 32.61 KG/M2

## 2025-08-15 DIAGNOSIS — Z34.81 ENCOUNTER FOR SUPERVISION OF NORMAL PREGNANCY IN MULTIGRAVIDA IN FIRST TRIMESTER: ICD-10-CM

## 2025-08-15 PROCEDURE — 3078F DIAST BP <80 MM HG: CPT | Performed by: ADVANCED PRACTICE MIDWIFE

## 2025-08-15 PROCEDURE — 3074F SYST BP LT 130 MM HG: CPT | Performed by: ADVANCED PRACTICE MIDWIFE

## 2025-08-15 PROCEDURE — 0502F SUBSEQUENT PRENATAL CARE: CPT | Performed by: ADVANCED PRACTICE MIDWIFE

## 2025-08-15 ASSESSMENT — FIBROSIS 4 INDEX: FIB4 SCORE: 0.61

## 2025-08-18 ENCOUNTER — TELEPHONE (OUTPATIENT)
Dept: OBGYN | Facility: CLINIC | Age: 30
End: 2025-08-18

## 2025-09-09 ENCOUNTER — APPOINTMENT (OUTPATIENT)
Dept: OBGYN | Facility: CLINIC | Age: 30
End: 2025-09-09
Attending: PHYSICIAN ASSISTANT

## 2025-09-18 ENCOUNTER — APPOINTMENT (OUTPATIENT)
Dept: OBGYN | Facility: CLINIC | Age: 30
End: 2025-09-18
Attending: PHYSICIAN ASSISTANT

## 2025-09-22 ENCOUNTER — APPOINTMENT (OUTPATIENT)
Dept: OBGYN | Facility: CLINIC | Age: 30
End: 2025-09-22
Attending: PHYSICIAN ASSISTANT

## (undated) DEVICE — CATHETER IV NON-SAFETY 18 GA X 1 1/4 (50/BX 4BX/CA)

## (undated) DEVICE — KIT  I.V. START (100EA/CA)

## (undated) DEVICE — TUBE SUCTION YANKAUER  1/4 X 6FT (20EA/CA)"

## (undated) DEVICE — WATER IRRIG. STER. 1500 ML - (9/CA)

## (undated) DEVICE — SOLUTION 10%PVP-IODINE 8OZ - (24/CA)

## (undated) DEVICE — GLOVE, BIOGEL ECLIPSE, SZ 7.0, PF LTX (50/BX)

## (undated) DEVICE — TRAY SRGPRP PVP IOD WT PRP - (20/CA)

## (undated) DEVICE — LEGGING LITHOTOMY 31 X 48 IN - (2EA/PK 20PK/CA)

## (undated) DEVICE — HEAD HOLDER JUNIOR/ADULT

## (undated) DEVICE — TUBING CLEARLINK DUO-VENT - C-FLO (48EA/CA)

## (undated) DEVICE — SET EXTENSION WITH 2 PORTS (48EA/CA) ***PART #2C8610 IS A SUBSTITUTE*****

## (undated) DEVICE — PACK DELIVERY ROOM (7EA/CA)

## (undated) DEVICE — VACURETTE 12MM CURVED 10/PKG

## (undated) DEVICE — DETERGENT RENUZYME PLUS 10 OZ PACKET (50/BX)